# Patient Record
Sex: FEMALE | Race: BLACK OR AFRICAN AMERICAN | Employment: OTHER | URBAN - METROPOLITAN AREA
[De-identification: names, ages, dates, MRNs, and addresses within clinical notes are randomized per-mention and may not be internally consistent; named-entity substitution may affect disease eponyms.]

---

## 2022-08-24 ENCOUNTER — HOSPITAL ENCOUNTER (EMERGENCY)
Age: 67
Discharge: HOME | End: 2022-08-24
Attending: EMERGENCY MEDICINE
Payer: MEDICAID

## 2022-08-24 ENCOUNTER — APPOINTMENT (OUTPATIENT)
Dept: GENERAL RADIOLOGY | Age: 67
End: 2022-08-24
Attending: EMERGENCY MEDICINE
Payer: MEDICAID

## 2022-08-24 VITALS
BODY MASS INDEX: 27.32 KG/M2 | TEMPERATURE: 99.8 F | OXYGEN SATURATION: 98 % | HEIGHT: 66 IN | DIASTOLIC BLOOD PRESSURE: 70 MMHG | HEART RATE: 82 BPM | WEIGHT: 170 LBS | RESPIRATION RATE: 16 BRPM | SYSTOLIC BLOOD PRESSURE: 128 MMHG

## 2022-08-24 DIAGNOSIS — J06.9 ACUTE UPPER RESPIRATORY INFECTION: Primary | ICD-10-CM

## 2022-08-24 DIAGNOSIS — R50.9 ACUTE FEBRILE ILLNESS: ICD-10-CM

## 2022-08-24 LAB
FLUAV AG NPH QL IA: NEGATIVE
FLUBV AG NOSE QL IA: NEGATIVE
SARS-COV-2, XPLCVT: NOT DETECTED
SOURCE, COVRS: NORMAL

## 2022-08-24 PROCEDURE — 87804 INFLUENZA ASSAY W/OPTIC: CPT

## 2022-08-24 PROCEDURE — 99283 EMERGENCY DEPT VISIT LOW MDM: CPT

## 2022-08-24 PROCEDURE — 71045 X-RAY EXAM CHEST 1 VIEW: CPT

## 2022-08-24 PROCEDURE — U0005 INFEC AGEN DETEC AMPLI PROBE: HCPCS

## 2022-08-24 RX ORDER — CANDESARTAN 16 MG/1
TABLET ORAL DAILY
Status: ON HOLD | COMMUNITY
End: 2022-08-30

## 2022-08-24 RX ORDER — ATENOLOL 50 MG/1
TABLET ORAL DAILY
Status: ON HOLD | COMMUNITY
End: 2022-09-01 | Stop reason: SDUPTHER

## 2022-08-24 NOTE — ED TRIAGE NOTES
Fever, headache and fatigue since x days. Took Naproxen at home this morning approx 9:15 am for left knee pain.

## 2022-08-24 NOTE — ED PROVIDER NOTES
30-year-old female with history of diabetes and hypertension presents to the emergency department chief complaint of fever for the past several days. Fever up to 100.8 at home. She endorses headaches, myalgias, cough, congestion. She took a COVID test yesterday which was negative. She had COVID last year and is immunized. No flu vaccine. No sick contacts. No shortness of breath or chest pain. The history is provided by the patient and medical records. Headache   This is a new problem. The current episode started more than 2 days ago. Associated symptoms include a fever. Pertinent negatives include no shortness of breath, no weakness, no nausea and no vomiting. Fever   The current episode started more than 2 days ago. The maximum temperature noted was 100 - 100.9 F. Associated symptoms include headaches and cough. Pertinent negatives include no chest pain, no diarrhea, no vomiting, no sore throat, no shortness of breath and no rash. Fatigue  Pertinent negatives include no agitation. Associated symptoms include headaches. Pertinent negatives include no shortness of breath, no chest pain, no vomiting and no nausea. Past Medical History:   Diagnosis Date    Diabetes ([address]     Hypertension        Past Surgical History:   Procedure Laterality Date    HX GYN           History reviewed. No pertinent family history.     Social History     Socioeconomic History    Marital status: Not on file     Spouse name: Not on file    Number of children: Not on file    Years of education: Not on file    Highest education level: Not on file   Occupational History    Not on file   Tobacco Use    Smoking status: Not on file    Smokeless tobacco: Not on file   Vaping Use    Vaping Use: Not on file   Substance and Sexual Activity    Alcohol use: Not on file    Drug use: Never    Sexual activity: Not Currently   Other Topics Concern    Not on file   Social History Narrative    Not on file     Social Determinants of Health Financial Resource Strain: Not on file   Food Insecurity: Not on file   Transportation Needs: Not on file   Physical Activity: Not on file   Stress: Not on file   Social Connections: Not on file   Intimate Partner Violence: Not on file   Housing Stability: Not on file         ALLERGIES: Penicillins    Review of Systems   Constitutional:  Positive for fatigue and fever. HENT:  Negative for sneezing and sore throat. Respiratory:  Positive for cough. Negative for shortness of breath. Cardiovascular:  Negative for chest pain and leg swelling. Gastrointestinal:  Negative for abdominal pain, diarrhea, nausea and vomiting. Genitourinary:  Negative for difficulty urinating and dysuria. Musculoskeletal:  Positive for myalgias. Negative for arthralgias. Skin:  Negative for color change and rash. Neurological:  Positive for headaches. Negative for weakness. Psychiatric/Behavioral:  Negative for agitation and behavioral problems. Vitals:    08/24/22 1005 08/24/22 1012   BP: 128/70    Pulse: 82    Resp: 16    Temp: 99.8 °F (37.7 °C)    SpO2: 98%    Weight:  77.1 kg (170 lb)   Height:  5' 6\" (1.676 m)            Physical Exam  Vitals and nursing note reviewed. Constitutional:       General: She is not in acute distress. Appearance: Normal appearance. She is well-developed. She is not ill-appearing, toxic-appearing or diaphoretic. HENT:      Head: Normocephalic and atraumatic. Nose: Nose normal.      Mouth/Throat:      Mouth: Mucous membranes are moist.      Pharynx: Oropharynx is clear. Eyes:      Extraocular Movements: Extraocular movements intact. Conjunctiva/sclera: Conjunctivae normal.      Pupils: Pupils are equal, round, and reactive to light. Cardiovascular:      Rate and Rhythm: Normal rate and regular rhythm. Pulses: Normal pulses. Heart sounds: Normal heart sounds. Pulmonary:      Effort: Pulmonary effort is normal. No respiratory distress.       Breath sounds: Normal breath sounds. No wheezing. Chest:      Chest wall: No tenderness. Abdominal:      General: Abdomen is flat. There is no distension. Palpations: Abdomen is soft. Tenderness: There is no abdominal tenderness. There is no guarding or rebound. Musculoskeletal:         General: No swelling, tenderness, deformity or signs of injury. Normal range of motion. Cervical back: Normal range of motion and neck supple. No rigidity. No muscular tenderness. Right lower leg: No edema. Left lower leg: No edema. Skin:     General: Skin is warm and dry. Capillary Refill: Capillary refill takes less than 2 seconds. Neurological:      General: No focal deficit present. Mental Status: She is alert and oriented to person, place, and time. Psychiatric:         Mood and Affect: Mood normal.         Behavior: Behavior normal.        MDM  Number of Diagnoses or Management Options  Diagnosis management comments: 45-year-old female presents as above with fever and other symptoms of upper respiratory infection. Reassuring chest x-ray and negative rapid flu. Potentially has COVID, will send PCR as she already tested negative with a rapid. Treat symptomatically, follow-up with primary care, return if needed.        Amount and/or Complexity of Data Reviewed  Clinical lab tests: reviewed  Tests in the radiology section of CPT®: reviewed           Procedures

## 2022-08-25 ENCOUNTER — HOSPITAL ENCOUNTER (INPATIENT)
Age: 67
LOS: 7 days | Discharge: HOME HEALTH | DRG: 853 | End: 2022-09-01
Attending: EMERGENCY MEDICINE | Admitting: STUDENT IN AN ORGANIZED HEALTH CARE EDUCATION/TRAINING PROGRAM
Payer: MEDICAID

## 2022-08-25 ENCOUNTER — APPOINTMENT (OUTPATIENT)
Dept: CT IMAGING | Age: 67
DRG: 853 | End: 2022-08-25
Attending: EMERGENCY MEDICINE

## 2022-08-25 DIAGNOSIS — R78.81 MSSA BACTEREMIA: ICD-10-CM

## 2022-08-25 DIAGNOSIS — A41.01 SEPSIS DUE TO METHICILLIN SUSCEPTIBLE STAPHYLOCOCCUS AUREUS, UNSPECIFIED WHETHER ACUTE ORGAN DYSFUNCTION PRESENT (HCC): ICD-10-CM

## 2022-08-25 DIAGNOSIS — R65.20 SEVERE SEPSIS (HCC): Primary | ICD-10-CM

## 2022-08-25 DIAGNOSIS — N30.91 HEMORRHAGIC CYSTITIS: ICD-10-CM

## 2022-08-25 DIAGNOSIS — A41.9 SEVERE SEPSIS (HCC): Primary | ICD-10-CM

## 2022-08-25 DIAGNOSIS — B95.61 MSSA BACTEREMIA: ICD-10-CM

## 2022-08-25 DIAGNOSIS — M00.062 STAPHYLOCOCCAL ARTHRITIS OF LEFT KNEE (HCC): ICD-10-CM

## 2022-08-25 LAB
ALBUMIN SERPL-MCNC: 3.1 G/DL (ref 3.5–5)
ALBUMIN/GLOB SERPL: 0.6 {RATIO} (ref 1.1–2.2)
ALP SERPL-CCNC: 128 U/L (ref 45–117)
ALT SERPL-CCNC: 15 U/L (ref 12–78)
ANION GAP SERPL CALC-SCNC: 7 MMOL/L (ref 5–15)
APPEARANCE UR: ABNORMAL
AST SERPL-CCNC: 12 U/L (ref 15–37)
BACTERIA URNS QL MICRO: NEGATIVE /HPF
BASOPHILS # BLD: 0 K/UL (ref 0–0.1)
BASOPHILS NFR BLD: 0 % (ref 0–1)
BILIRUB SERPL-MCNC: 0.6 MG/DL (ref 0.2–1)
BILIRUB UR QL: NEGATIVE
BUN SERPL-MCNC: 17 MG/DL (ref 6–20)
BUN/CREAT SERPL: 16 (ref 12–20)
CALCIUM SERPL-MCNC: 9.5 MG/DL (ref 8.5–10.1)
CHLORIDE SERPL-SCNC: 96 MMOL/L (ref 97–108)
CO2 SERPL-SCNC: 30 MMOL/L (ref 21–32)
COLOR UR: ABNORMAL
CREAT SERPL-MCNC: 1.04 MG/DL (ref 0.55–1.02)
CRP SERPL-MCNC: 27.02 MG/DL (ref 0–0.6)
DIFFERENTIAL METHOD BLD: ABNORMAL
EOSINOPHIL # BLD: 0.3 K/UL (ref 0–0.4)
EOSINOPHIL NFR BLD: 2 % (ref 0–7)
EPITH CASTS URNS QL MICRO: ABNORMAL /LPF
ERYTHROCYTE [DISTWIDTH] IN BLOOD BY AUTOMATED COUNT: 12.3 % (ref 11.5–14.5)
GLOBULIN SER CALC-MCNC: 5 G/DL (ref 2–4)
GLUCOSE SERPL-MCNC: 292 MG/DL (ref 65–100)
GLUCOSE UR STRIP.AUTO-MCNC: >1000 MG/DL
HCT VFR BLD AUTO: 36.1 % (ref 35–47)
HGB BLD-MCNC: 11.9 G/DL (ref 11.5–16)
HGB UR QL STRIP: ABNORMAL
IMM GRANULOCYTES # BLD AUTO: 0 K/UL
IMM GRANULOCYTES NFR BLD AUTO: 0 %
KETONES UR QL STRIP.AUTO: 40 MG/DL
LACTATE BLD-SCNC: 1.16 MMOL/L (ref 0.4–2)
LACTATE BLD-SCNC: 2.71 MMOL/L (ref 0.4–2)
LEUKOCYTE ESTERASE UR QL STRIP.AUTO: ABNORMAL
LYMPHOCYTES # BLD: 2.9 K/UL (ref 0.8–3.5)
LYMPHOCYTES NFR BLD: 21 % (ref 12–49)
MCH RBC QN AUTO: 27 PG (ref 26–34)
MCHC RBC AUTO-ENTMCNC: 33 G/DL (ref 30–36.5)
MCV RBC AUTO: 81.9 FL (ref 80–99)
MONOCYTES # BLD: 1 K/UL (ref 0–1)
MONOCYTES NFR BLD: 7 % (ref 5–13)
NEUTS BAND NFR BLD MANUAL: 4 % (ref 0–6)
NEUTS SEG # BLD: 9.8 K/UL (ref 1.8–8)
NEUTS SEG NFR BLD: 66 % (ref 32–75)
NITRITE UR QL STRIP.AUTO: NEGATIVE
NRBC # BLD: 0 K/UL (ref 0–0.01)
NRBC BLD-RTO: 0 PER 100 WBC
PH UR STRIP: 6 [PH] (ref 5–8)
PLATELET # BLD AUTO: 380 K/UL (ref 150–400)
PMV BLD AUTO: 10 FL (ref 8.9–12.9)
POTASSIUM SERPL-SCNC: 3.3 MMOL/L (ref 3.5–5.1)
PROCALCITONIN SERPL-MCNC: 0.15 NG/ML
PROT SERPL-MCNC: 8.1 G/DL (ref 6.4–8.2)
PROT UR STRIP-MCNC: 100 MG/DL
RBC # BLD AUTO: 4.41 M/UL (ref 3.8–5.2)
RBC #/AREA URNS HPF: >100 /HPF (ref 0–5)
RBC MORPH BLD: ABNORMAL
SODIUM SERPL-SCNC: 133 MMOL/L (ref 136–145)
SP GR UR REFRACTOMETRY: 1.02 (ref 1–1.03)
UA: UC IF INDICATED,UAUC: ABNORMAL
UROBILINOGEN UR QL STRIP.AUTO: 1 EU/DL (ref 0.2–1)
WBC # BLD AUTO: 14 K/UL (ref 3.6–11)
WBC URNS QL MICRO: ABNORMAL /HPF (ref 0–4)

## 2022-08-25 PROCEDURE — 83605 ASSAY OF LACTIC ACID: CPT

## 2022-08-25 PROCEDURE — 96368 THER/DIAG CONCURRENT INF: CPT

## 2022-08-25 PROCEDURE — 80053 COMPREHEN METABOLIC PANEL: CPT

## 2022-08-25 PROCEDURE — 87040 BLOOD CULTURE FOR BACTERIA: CPT

## 2022-08-25 PROCEDURE — 96361 HYDRATE IV INFUSION ADD-ON: CPT

## 2022-08-25 PROCEDURE — 87186 SC STD MICRODIL/AGAR DIL: CPT

## 2022-08-25 PROCEDURE — 87150 DNA/RNA AMPLIFIED PROBE: CPT

## 2022-08-25 PROCEDURE — 96365 THER/PROPH/DIAG IV INF INIT: CPT

## 2022-08-25 PROCEDURE — 99285 EMERGENCY DEPT VISIT HI MDM: CPT

## 2022-08-25 PROCEDURE — 81001 URINALYSIS AUTO W/SCOPE: CPT

## 2022-08-25 PROCEDURE — 84145 PROCALCITONIN (PCT): CPT

## 2022-08-25 PROCEDURE — 74176 CT ABD & PELVIS W/O CONTRAST: CPT

## 2022-08-25 PROCEDURE — 96375 TX/PRO/DX INJ NEW DRUG ADDON: CPT

## 2022-08-25 PROCEDURE — 74011000250 HC RX REV CODE- 250: Performed by: EMERGENCY MEDICINE

## 2022-08-25 PROCEDURE — 87086 URINE CULTURE/COLONY COUNT: CPT

## 2022-08-25 PROCEDURE — 87077 CULTURE AEROBIC IDENTIFY: CPT

## 2022-08-25 PROCEDURE — 86140 C-REACTIVE PROTEIN: CPT

## 2022-08-25 PROCEDURE — 85025 COMPLETE CBC W/AUTO DIFF WBC: CPT

## 2022-08-25 PROCEDURE — 36415 COLL VENOUS BLD VENIPUNCTURE: CPT

## 2022-08-25 PROCEDURE — 93005 ELECTROCARDIOGRAM TRACING: CPT

## 2022-08-25 PROCEDURE — 74011250636 HC RX REV CODE- 250/636: Performed by: EMERGENCY MEDICINE

## 2022-08-25 PROCEDURE — 65270000029 HC RM PRIVATE

## 2022-08-25 PROCEDURE — 74011250637 HC RX REV CODE- 250/637: Performed by: EMERGENCY MEDICINE

## 2022-08-25 RX ORDER — VANCOMYCIN 2 GRAM/500 ML IN 0.9 % SODIUM CHLORIDE INTRAVENOUS
2000 ONCE
Status: DISCONTINUED | OUTPATIENT
Start: 2022-08-25 | End: 2022-08-25

## 2022-08-25 RX ORDER — ACETAMINOPHEN 500 MG
1000 TABLET ORAL
Status: DISPENSED | OUTPATIENT
Start: 2022-08-25 | End: 2022-08-26

## 2022-08-25 RX ORDER — IBUPROFEN 800 MG/1
800 TABLET ORAL
Status: COMPLETED | OUTPATIENT
Start: 2022-08-25 | End: 2022-08-25

## 2022-08-25 RX ORDER — ACETAMINOPHEN 500 MG
1000 TABLET ORAL
Status: COMPLETED | OUTPATIENT
Start: 2022-08-25 | End: 2022-08-25

## 2022-08-25 RX ORDER — CODEINE PHOSPHATE AND GUAIFENESIN 10; 100 MG/5ML; MG/5ML
10 SOLUTION ORAL
Status: COMPLETED | OUTPATIENT
Start: 2022-08-25 | End: 2022-08-25

## 2022-08-25 RX ORDER — SODIUM CHLORIDE 0.9 % (FLUSH) 0.9 %
5-10 SYRINGE (ML) INJECTION AS NEEDED
Status: DISCONTINUED | OUTPATIENT
Start: 2022-08-25 | End: 2022-09-01 | Stop reason: HOSPADM

## 2022-08-25 RX ADMIN — SODIUM CHLORIDE 1000 ML: 9 INJECTION, SOLUTION INTRAVENOUS at 20:11

## 2022-08-25 RX ADMIN — ACETAMINOPHEN 1000 MG: 500 TABLET ORAL at 21:11

## 2022-08-25 RX ADMIN — SODIUM CHLORIDE 1000 ML: 9 INJECTION, SOLUTION INTRAVENOUS at 20:54

## 2022-08-25 RX ADMIN — CEFEPIME 2 G: 2 INJECTION, POWDER, FOR SOLUTION INTRAVENOUS at 20:03

## 2022-08-25 RX ADMIN — GUAIFENESIN AND CODEINE PHOSPHATE 10 ML: 100; 10 SOLUTION ORAL at 21:11

## 2022-08-25 RX ADMIN — IBUPROFEN 800 MG: 800 TABLET, FILM COATED ORAL at 20:03

## 2022-08-25 RX ADMIN — SODIUM CHLORIDE 500 ML: 900 INJECTION, SOLUTION INTRAVENOUS at 21:55

## 2022-08-25 RX ADMIN — VANCOMYCIN HYDROCHLORIDE 1000 MG: 1 INJECTION, POWDER, LYOPHILIZED, FOR SOLUTION INTRAVENOUS at 21:55

## 2022-08-25 RX ADMIN — VANCOMYCIN HYDROCHLORIDE 1000 MG: 1 INJECTION, POWDER, LYOPHILIZED, FOR SOLUTION INTRAVENOUS at 22:13

## 2022-08-25 NOTE — ED PROVIDER NOTES
71-year-old female with a past medical history significant for diabetes and hypertension who presents to the ER with a complaint of malaise, body aches, left leg pain and bloody urination that began this evening. The patient was seen evaluated for URI symptoms that she has had off and on for the past 3 days yesterday. She had a negative chest x-ray. She denies any nausea or vomiting, abdominal pain, headache, neck and back pain, skin rash, sick contacts or recent travel, history of anticoagulant use. She is vaccinated against COVID-19. Past Medical History:   Diagnosis Date    Diabetes ([facility] [address]     Hypertension        Past Surgical History:   Procedure Laterality Date    HX GYN           History reviewed. No pertinent family history. Social History     Socioeconomic History    Marital status:      Spouse name: Not on file    Number of children: Not on file    Years of education: Not on file    Highest education level: Not on file   Occupational History    Not on file   Tobacco Use    Smoking status: Not on file    Smokeless tobacco: Not on file   Vaping Use    Vaping Use: Not on file   Substance and Sexual Activity    Alcohol use: Not on file    Drug use: Never    Sexual activity: Not Currently   Other Topics Concern    Not on file   Social History Narrative    Not on file     Social Determinants of Health     Financial Resource Strain: Not on file   Food Insecurity: Not on file   Transportation Needs: Not on file   Physical Activity: Not on file   Stress: Not on file   Social Connections: Not on file   Intimate Partner Violence: Not on file   Housing Stability: Not on file         ALLERGIES: Penicillins    Review of Systems   All other systems reviewed and are negative.     Vitals:    08/25/22 1931 08/25/22 1934   BP: (!) 139/116 (!) 157/70   Pulse: 96    Resp: 18    Temp: (!) 102.8 °F (39.3 °C)    SpO2: 100%    Weight: 79.4 kg (175 lb)    Height: 5' 5\" (1.651 m)             Physical Exam  Vitals and nursing note reviewed. Exam conducted with a chaperone present. CONSTITUTIONAL: Well-appearing; well-nourished; in no apparent distress  HEAD: Normocephalic; atraumatic  EYES: PERRL; EOM intact; conjunctiva and sclera are clear bilaterally. ENT: No rhinorrhea; normal pharynx with no tonsillar hypertrophy; mucous membranes pink/moist, no erythema, no exudate. NECK: Supple; non-tender; no cervical lymphadenopathy  CARD: Normal S1, S2; no murmurs, rubs, or gallops. Regular rate and rhythm. RESP: Normal respiratory effort; breath sounds clear and equal bilaterally; no wheezes, rhonchi, or rales. ABD: Normal bowel sounds; non-distended; non-tender; no palpable organomegaly, no masses, no bruits. Back Exam: Normal inspection; no vertebral point tenderness, no CVA tenderness. Normal range of motion. EXT: Normal ROM in all four extremities; non-tender to palpation; no swelling or deformity; distal pulses are normal, no edema. SKIN: Warm; dry; no rash. NEURO:Alert and oriented x 3, coherent, ANGELES-XII grossly intact, sensory and motor are non-focal.        MDM  Number of Diagnoses or Management Options  Hemorrhagic cystitis  Severe sepsis ([facility] [address]  Diagnosis management comments: Assessment: Acute febrile illness on a 53-year-old female with hypertension diabetes with malaise, fever and hematuria. Rule out sepsis with occult bacteremia especially UTI/cystitis/pyelonephritis. Plan: Lab/IV fluid/antipyretic/broad-spectrum antibiotics/CT scan of the abdomen and pelvis/education, reassurance, symptomatic treatment/ Monitor and Reevaluate.          Amount and/or Complexity of Data Reviewed  Clinical lab tests: ordered and reviewed  Tests in the radiology section of CPT®: ordered and reviewed  Tests in the medicine section of CPT®: reviewed and ordered  Discussion of test results with the performing providers: yes  Decide to obtain previous medical records or to obtain history from someone other than the patient: yes  Obtain history from someone other than the patient: yes  Review and summarize past medical records: yes  Discuss the patient with other providers: yes  Independent visualization of images, tracings, or specimens: yes    Risk of Complications, Morbidity, and/or Mortality  Presenting problems: moderate  Diagnostic procedures: moderate  Management options: moderate           Procedures    PROGRESS NOTE:  Pt has been reexamined by Doc Lepe MD all available results have been reviewed with pt and any available family. Pt understands sx, dx, and tx in ED. Care plan has been outlined and questions have been answered. Pt and any available family understands and agrees to need for admission to hospital for further tx not available in ED. Pt is ready for admission. Written by Melvin Marks MD,  9:47 PM    ED EKG interpretation:  Rhythm: normal sinus rhythm; and regular . Rate (approx.): 89; Axis: normal; P wave: normal; QRS interval: normal ; ST/T wave: non-specific changes; in  Lead: Diffusely; Other findings: abnormal ekg. This EKG was interpreted by Melvin Marks MD,ED Provider. CONSULT NOTE:  Doc Lepe MD spoke with Dr. Hector Reese of the adult hospitalist team. Discussed patient's presentation, history, physical assessment, and available diagnostic results. He will evaluate, write orders and admit the patient to the hospital. 9:48 PM     Perfect Serve Consult for Admission  9:48 PM    ED Room Number: WER02/02  Patient Name and age:  Ned Meredith 74 y.o.  female  Working Diagnosis:   1. Severe sepsis ([address]    2.  Hemorrhagic cystitis        COVID-19 Suspicion:  no  Sepsis present:  yes  Reassessment needed: yes  Code Status:  Full Code  Readmission: no  Isolation Requirements:  yes  Recommended Level of Care:  med/surg  Department: [facility] ED - [phone]  Admitting Provider: Hector Reese    Other: 62-year-old with diabetes who returned to the ER after negative for congestion with fever and bloody urine. She did evaluation for severe sepsis with hemorrhagic cystitis and occult bacteremia. Had negative COVID, influenza and chest x-ray yesterday. Total critical care time spent exclusive of procedures:  65 minutes. Code Sepsis Reassessment & Plan    - Sepsis order set entered: YES  - Broad Spectrum Antibiotics given: Cefepime and Vancomycin  - Repeat lactic acid ordered for time now  - Re-assessment performed at time 23:30 and clinical condition improving.    - Actions taken: None. - Hypotension or Lactic Acidosis present (SBP<90, MAP<65, Lactate >4): NO   - IVF: 30 cc/kg actual Body Weight  - Persistent Septic Shock present (Hypotension despite IVF resuscitation): NO  Vasopressors: Not indicated due to septic shock not present  - Disposition: Admit to the Renetta Andre MD 23:30             .

## 2022-08-25 NOTE — ED TRIAGE NOTES
Pt reports she was here yesterday for leg pain which continues today and now has developed blood in the urine this morning.

## 2022-08-26 LAB
ACC. NO. FROM MICRO ORDER, ACCP: ABNORMAL
ACINETOBACTER CALCOACETICUS-BAUMANII COMPLEX, ACBCX: NOT DETECTED
ALBUMIN SERPL-MCNC: 2.3 G/DL (ref 3.5–5)
ALBUMIN/GLOB SERPL: 0.6 {RATIO} (ref 1.1–2.2)
ALP SERPL-CCNC: 104 U/L (ref 45–117)
ALT SERPL-CCNC: 14 U/L (ref 12–78)
ANION GAP SERPL CALC-SCNC: 6 MMOL/L (ref 5–15)
AST SERPL-CCNC: 15 U/L (ref 15–37)
B PERT DNA SPEC QL NAA+PROBE: NOT DETECTED
BACTEROIDES FRAGILIS, BFRA: NOT DETECTED
BASOPHILS # BLD: 0 K/UL (ref 0–0.1)
BASOPHILS NFR BLD: 0 % (ref 0–1)
BILIRUB SERPL-MCNC: 0.4 MG/DL (ref 0.2–1)
BIOFIRE COMMENT, BCIDPF: ABNORMAL
BORDETELLA PARAPERTUSSIS PCR, BORPAR: NOT DETECTED
BUN SERPL-MCNC: 12 MG/DL (ref 6–20)
BUN/CREAT SERPL: 19 (ref 12–20)
C GLABRATA DNA VAG QL NAA+PROBE: NOT DETECTED
C PNEUM DNA SPEC QL NAA+PROBE: NOT DETECTED
CALCIUM SERPL-MCNC: 8.3 MG/DL (ref 8.5–10.1)
CANDIDA ALBICANS: NOT DETECTED
CANDIDA AURIS, CAAU: NOT DETECTED
CANDIDA KRUSEI, CKRP: NOT DETECTED
CANDIDA PARAPSILOSIS, CPAUP: NOT DETECTED
CANDIDA TROPICALIS, CTROP: NOT DETECTED
CHLORIDE SERPL-SCNC: 108 MMOL/L (ref 97–108)
CO2 SERPL-SCNC: 27 MMOL/L (ref 21–32)
CREAT SERPL-MCNC: 0.64 MG/DL (ref 0.55–1.02)
CRYPTO NEOFORMANS/GATTII, CRYNEG: NOT DETECTED
DIFFERENTIAL METHOD BLD: ABNORMAL
ENTEROBACTER CLOACAE COMPLEX, ECCP: NOT DETECTED
ENTEROBACTERALES SP. , ENBLS: NOT DETECTED
ENTEROCOCCUS FAECALIS, ENFA: NOT DETECTED
ENTEROCOCCUS FAECIUM, ENFAM: NOT DETECTED
EOSINOPHIL # BLD: 0.1 K/UL (ref 0–0.4)
EOSINOPHIL NFR BLD: 1 % (ref 0–7)
ERYTHROCYTE [DISTWIDTH] IN BLOOD BY AUTOMATED COUNT: 12.1 % (ref 11.5–14.5)
ESCHERICHIA COLI: NOT DETECTED
EST. AVERAGE GLUCOSE BLD GHB EST-MCNC: 258 MG/DL
FLUAV SUBTYP SPEC NAA+PROBE: NOT DETECTED
FLUBV RNA SPEC QL NAA+PROBE: NOT DETECTED
GLOBULIN SER CALC-MCNC: 4 G/DL (ref 2–4)
GLUCOSE BLD STRIP.AUTO-MCNC: 182 MG/DL (ref 65–117)
GLUCOSE BLD STRIP.AUTO-MCNC: 273 MG/DL (ref 65–117)
GLUCOSE BLD STRIP.AUTO-MCNC: 288 MG/DL (ref 65–117)
GLUCOSE SERPL-MCNC: 200 MG/DL (ref 65–100)
HADV DNA SPEC QL NAA+PROBE: NOT DETECTED
HAEMOPHILUS INFLUENZAE, HMI: NOT DETECTED
HBA1C MFR BLD: 10.6 % (ref 4–5.6)
HCOV 229E RNA SPEC QL NAA+PROBE: NOT DETECTED
HCOV HKU1 RNA SPEC QL NAA+PROBE: NOT DETECTED
HCOV NL63 RNA SPEC QL NAA+PROBE: NOT DETECTED
HCOV OC43 RNA SPEC QL NAA+PROBE: NOT DETECTED
HCT VFR BLD AUTO: 29.8 % (ref 35–47)
HGB BLD-MCNC: 10.1 G/DL (ref 11.5–16)
HMPV RNA SPEC QL NAA+PROBE: NOT DETECTED
HPIV1 RNA SPEC QL NAA+PROBE: NOT DETECTED
HPIV2 RNA SPEC QL NAA+PROBE: NOT DETECTED
HPIV3 RNA SPEC QL NAA+PROBE: NOT DETECTED
HPIV4 RNA SPEC QL NAA+PROBE: NOT DETECTED
IMM GRANULOCYTES # BLD AUTO: 0 K/UL
IMM GRANULOCYTES NFR BLD AUTO: 0 %
KLEBSIELLA AEROGENES, KLAE: NOT DETECTED
KLEBSIELLA OXYTOCA: NOT DETECTED
KLEBSIELLA PNEUMONIAE GROUP, KPPG: NOT DETECTED
LISTERIA MONOCYTOGENES, LMONP: NOT DETECTED
LYMPHOCYTES # BLD: 3.1 K/UL (ref 0.8–3.5)
LYMPHOCYTES NFR BLD: 27 % (ref 12–49)
M PNEUMO DNA SPEC QL NAA+PROBE: NOT DETECTED
MCH RBC QN AUTO: 27.6 PG (ref 26–34)
MCHC RBC AUTO-ENTMCNC: 33.9 G/DL (ref 30–36.5)
MCV RBC AUTO: 81.4 FL (ref 80–99)
MECA/C AND MREJ (METHICILLIN RESISTANT GENE), MECAC: NOT DETECTED
MONOCYTES # BLD: 0.8 K/UL (ref 0–1)
MONOCYTES NFR BLD: 7 % (ref 5–13)
MYELOCYTES NFR BLD MANUAL: 1 %
NEISSERIA MENINGITIDIS, NMNI: NOT DETECTED
NEUTS BAND NFR BLD MANUAL: 5 % (ref 0–6)
NEUTS SEG # BLD: 7.3 K/UL (ref 1.8–8)
NEUTS SEG NFR BLD: 59 % (ref 32–75)
NRBC # BLD: 0 K/UL (ref 0–0.01)
NRBC BLD-RTO: 0 PER 100 WBC
PLATELET # BLD AUTO: 322 K/UL (ref 150–400)
PMV BLD AUTO: 9.1 FL (ref 8.9–12.9)
POTASSIUM SERPL-SCNC: 3.2 MMOL/L (ref 3.5–5.1)
PROT SERPL-MCNC: 6.3 G/DL (ref 6.4–8.2)
PROTEUS, PRP: NOT DETECTED
PSEUDOMONAS AERUGINOSA: NOT DETECTED
RBC # BLD AUTO: 3.66 M/UL (ref 3.8–5.2)
RBC MORPH BLD: ABNORMAL
RESISTANT GENE SPACE, REGENE: ABNORMAL
RSV RNA SPEC QL NAA+PROBE: NOT DETECTED
RV+EV RNA SPEC QL NAA+PROBE: NOT DETECTED
SALMONELLA, SALMO: NOT DETECTED
SARS-COV-2 PCR, COVPCR: NOT DETECTED
SERRATIA MARCESCENS: NOT DETECTED
SERVICE CMNT-IMP: ABNORMAL
SODIUM SERPL-SCNC: 141 MMOL/L (ref 136–145)
STAPH EPIDERMIDIS, STEP: NOT DETECTED
STAPH LUGDUNENSIS, STALUG: NOT DETECTED
STAPHYLOCOCCUS AUREUS: DETECTED
STAPHYLOCOCCUS, STAPP: DETECTED
STENO MALTOPHILIA, STMA: NOT DETECTED
STREPTOCOCCUS , STPSP: NOT DETECTED
STREPTOCOCCUS AGALACTIAE (GROUP B): NOT DETECTED
STREPTOCOCCUS PNEUMONIAE , SPNP: NOT DETECTED
STREPTOCOCCUS PYOGENES (GROUP A), SPYOP: NOT DETECTED
WBC # BLD AUTO: 11.4 K/UL (ref 3.6–11)

## 2022-08-26 PROCEDURE — 74011250637 HC RX REV CODE- 250/637: Performed by: STUDENT IN AN ORGANIZED HEALTH CARE EDUCATION/TRAINING PROGRAM

## 2022-08-26 PROCEDURE — 85025 COMPLETE CBC W/AUTO DIFF WBC: CPT

## 2022-08-26 PROCEDURE — 74011000258 HC RX REV CODE- 258: Performed by: INTERNAL MEDICINE

## 2022-08-26 PROCEDURE — 36415 COLL VENOUS BLD VENIPUNCTURE: CPT

## 2022-08-26 PROCEDURE — 80053 COMPREHEN METABOLIC PANEL: CPT

## 2022-08-26 PROCEDURE — 74011000258 HC RX REV CODE- 258: Performed by: STUDENT IN AN ORGANIZED HEALTH CARE EDUCATION/TRAINING PROGRAM

## 2022-08-26 PROCEDURE — 83036 HEMOGLOBIN GLYCOSYLATED A1C: CPT

## 2022-08-26 PROCEDURE — 74011636637 HC RX REV CODE- 636/637: Performed by: INTERNAL MEDICINE

## 2022-08-26 PROCEDURE — 74011250636 HC RX REV CODE- 250/636: Performed by: STUDENT IN AN ORGANIZED HEALTH CARE EDUCATION/TRAINING PROGRAM

## 2022-08-26 PROCEDURE — 74011250637 HC RX REV CODE- 250/637: Performed by: INTERNAL MEDICINE

## 2022-08-26 PROCEDURE — 74011250636 HC RX REV CODE- 250/636: Performed by: INTERNAL MEDICINE

## 2022-08-26 PROCEDURE — 74011000250 HC RX REV CODE- 250: Performed by: STUDENT IN AN ORGANIZED HEALTH CARE EDUCATION/TRAINING PROGRAM

## 2022-08-26 PROCEDURE — 74011250637 HC RX REV CODE- 250/637: Performed by: HOSPITALIST

## 2022-08-26 PROCEDURE — 82962 GLUCOSE BLOOD TEST: CPT

## 2022-08-26 PROCEDURE — 65270000029 HC RM PRIVATE

## 2022-08-26 PROCEDURE — 0202U NFCT DS 22 TRGT SARS-COV-2: CPT

## 2022-08-26 RX ORDER — ONDANSETRON 4 MG/1
4 TABLET, ORALLY DISINTEGRATING ORAL
Status: DISCONTINUED | OUTPATIENT
Start: 2022-08-26 | End: 2022-09-01 | Stop reason: HOSPADM

## 2022-08-26 RX ORDER — IBUPROFEN 400 MG/1
800 TABLET ORAL ONCE
Status: COMPLETED | OUTPATIENT
Start: 2022-08-26 | End: 2022-08-26

## 2022-08-26 RX ORDER — SODIUM CHLORIDE 0.9 % (FLUSH) 0.9 %
5-40 SYRINGE (ML) INJECTION EVERY 8 HOURS
Status: DISCONTINUED | OUTPATIENT
Start: 2022-08-26 | End: 2022-09-01 | Stop reason: HOSPADM

## 2022-08-26 RX ORDER — ACETAMINOPHEN 325 MG/1
650 TABLET ORAL
Status: DISCONTINUED | OUTPATIENT
Start: 2022-08-26 | End: 2022-09-01 | Stop reason: HOSPADM

## 2022-08-26 RX ORDER — IBUPROFEN 600 MG/1
600 TABLET ORAL
Status: DISCONTINUED | OUTPATIENT
Start: 2022-08-26 | End: 2022-09-01 | Stop reason: HOSPADM

## 2022-08-26 RX ORDER — SODIUM CHLORIDE 9 MG/ML
75 INJECTION, SOLUTION INTRAVENOUS CONTINUOUS
Status: DISCONTINUED | OUTPATIENT
Start: 2022-08-26 | End: 2022-08-26

## 2022-08-26 RX ORDER — ONDANSETRON 2 MG/ML
4 INJECTION INTRAMUSCULAR; INTRAVENOUS
Status: DISCONTINUED | OUTPATIENT
Start: 2022-08-26 | End: 2022-09-01 | Stop reason: HOSPADM

## 2022-08-26 RX ORDER — POTASSIUM CHLORIDE AND SODIUM CHLORIDE 900; 300 MG/100ML; MG/100ML
INJECTION, SOLUTION INTRAVENOUS CONTINUOUS
Status: DISCONTINUED | OUTPATIENT
Start: 2022-08-26 | End: 2022-08-27

## 2022-08-26 RX ORDER — INSULIN LISPRO 100 [IU]/ML
INJECTION, SOLUTION INTRAVENOUS; SUBCUTANEOUS
Status: DISCONTINUED | OUTPATIENT
Start: 2022-08-26 | End: 2022-09-01 | Stop reason: HOSPADM

## 2022-08-26 RX ORDER — VALSARTAN 80 MG/1
160 TABLET ORAL DAILY
Status: DISCONTINUED | OUTPATIENT
Start: 2022-08-26 | End: 2022-08-27

## 2022-08-26 RX ORDER — DEXTROSE MONOHYDRATE 100 MG/ML
0-250 INJECTION, SOLUTION INTRAVENOUS AS NEEDED
Status: DISCONTINUED | OUTPATIENT
Start: 2022-08-26 | End: 2022-09-01 | Stop reason: HOSPADM

## 2022-08-26 RX ORDER — GUAIFENESIN 100 MG/5ML
100 SOLUTION ORAL
Status: DISCONTINUED | OUTPATIENT
Start: 2022-08-26 | End: 2022-09-01 | Stop reason: HOSPADM

## 2022-08-26 RX ORDER — ACETAMINOPHEN 650 MG/1
650 SUPPOSITORY RECTAL
Status: DISCONTINUED | OUTPATIENT
Start: 2022-08-26 | End: 2022-09-01 | Stop reason: HOSPADM

## 2022-08-26 RX ORDER — POLYETHYLENE GLYCOL 3350 17 G/17G
17 POWDER, FOR SOLUTION ORAL DAILY PRN
Status: DISCONTINUED | OUTPATIENT
Start: 2022-08-26 | End: 2022-09-01 | Stop reason: HOSPADM

## 2022-08-26 RX ORDER — ENOXAPARIN SODIUM 100 MG/ML
40 INJECTION SUBCUTANEOUS EVERY 24 HOURS
Status: DISCONTINUED | OUTPATIENT
Start: 2022-08-27 | End: 2022-09-01 | Stop reason: HOSPADM

## 2022-08-26 RX ORDER — GUAIFENESIN 600 MG/1
600 TABLET, EXTENDED RELEASE ORAL EVERY 12 HOURS
Status: DISCONTINUED | OUTPATIENT
Start: 2022-08-26 | End: 2022-09-01 | Stop reason: HOSPADM

## 2022-08-26 RX ORDER — SODIUM CHLORIDE 0.9 % (FLUSH) 0.9 %
5-40 SYRINGE (ML) INJECTION AS NEEDED
Status: DISCONTINUED | OUTPATIENT
Start: 2022-08-26 | End: 2022-09-01 | Stop reason: HOSPADM

## 2022-08-26 RX ORDER — ATENOLOL 50 MG/1
50 TABLET ORAL DAILY
Status: DISCONTINUED | OUTPATIENT
Start: 2022-08-26 | End: 2022-09-01 | Stop reason: HOSPADM

## 2022-08-26 RX ORDER — MAGNESIUM SULFATE 100 %
4 CRYSTALS MISCELLANEOUS AS NEEDED
Status: DISCONTINUED | OUTPATIENT
Start: 2022-08-26 | End: 2022-09-01 | Stop reason: HOSPADM

## 2022-08-26 RX ADMIN — Medication 2 UNITS: at 18:09

## 2022-08-26 RX ADMIN — Medication 10 ML: at 12:01

## 2022-08-26 RX ADMIN — GUAIFENESIN 600 MG: 600 TABLET ORAL at 09:54

## 2022-08-26 RX ADMIN — CEFEPIME 2 G: 2 INJECTION, POWDER, FOR SOLUTION INTRAVENOUS at 17:36

## 2022-08-26 RX ADMIN — IBUPROFEN 600 MG: 600 TABLET, FILM COATED ORAL at 17:34

## 2022-08-26 RX ADMIN — INSULIN HUMAN 20 UNITS: 100 INJECTION, SUSPENSION SUBCUTANEOUS at 12:00

## 2022-08-26 RX ADMIN — VANCOMYCIN HYDROCHLORIDE 1000 MG: 1 INJECTION, POWDER, LYOPHILIZED, FOR SOLUTION INTRAVENOUS at 17:35

## 2022-08-26 RX ADMIN — ACETAMINOPHEN 650 MG: 325 TABLET, FILM COATED ORAL at 12:04

## 2022-08-26 RX ADMIN — INSULIN HUMAN 16 UNITS: 100 INJECTION, SUSPENSION SUBCUTANEOUS at 18:09

## 2022-08-26 RX ADMIN — Medication 3 UNITS: at 21:48

## 2022-08-26 RX ADMIN — Medication 10 ML: at 05:47

## 2022-08-26 RX ADMIN — SODIUM CHLORIDE 75 ML/HR: 9 INJECTION, SOLUTION INTRAVENOUS at 02:47

## 2022-08-26 RX ADMIN — Medication 5 UNITS: at 11:30

## 2022-08-26 RX ADMIN — CEFEPIME 2 G: 2 INJECTION, POWDER, FOR SOLUTION INTRAVENOUS at 09:54

## 2022-08-26 RX ADMIN — ACETAMINOPHEN 650 MG: 325 TABLET, FILM COATED ORAL at 21:52

## 2022-08-26 RX ADMIN — IBUPROFEN 800 MG: 400 TABLET, FILM COATED ORAL at 06:59

## 2022-08-26 RX ADMIN — POTASSIUM CHLORIDE AND SODIUM CHLORIDE: 900; 300 INJECTION, SOLUTION INTRAVENOUS at 10:03

## 2022-08-26 RX ADMIN — GUAIFENESIN 100 MG: 200 SOLUTION ORAL at 04:40

## 2022-08-26 RX ADMIN — Medication 10 ML: at 21:45

## 2022-08-26 RX ADMIN — ATENOLOL 50 MG: 50 TABLET ORAL at 09:54

## 2022-08-26 RX ADMIN — ACETAMINOPHEN 650 MG: 325 TABLET, FILM COATED ORAL at 02:47

## 2022-08-26 NOTE — H&P
History & Physical    Primary Care Provider: None  Source of Information: Patient and chart review    History of Presenting Illness:   Ayala Hutchinson is a 74 y.o. female with history of hypertension and insulin-dependent type 2 diabetes who presents to the hospital with complaints of fatigue, malaise and hematuria that began on evening prior to ED presentation. Reports urinary symptoms of for which she was evaluated 24 hours ago and had normal chest x-ray. Denies any associated dysuria, urgency, frequency or flank pain. The patient denies any fever, chills, chest or abdominal pain, nausea, vomiting, cough, congestion, recent illness, palpitations, or dysuria. Remarkable vitals on ER Presentations: T-max 102.9, BP to 160s over 70s  Labs Remarkable for: wbc 14, k-3.3, glu 292, crp 27.02, lactic jodee 2.71  ER Images: ct abd et pelvis: no acute process  ER Rx: 3.5L NS Bolus, cefepime     Review of Systems:  Pertinent items are noted in the History of Present Illness. Past Medical History:   Diagnosis Date    Diabetes ([address]     Hypertension       Past Surgical History:   Procedure Laterality Date    HX GYN       Prior to Admission medications    Medication Sig Start Date End Date Taking? Authorizing Provider   insulin NPH/insulin regular (NovoLIN 70/30 U-100 Insulin) 100 unit/mL (70-30) injection by SubCUTAneous route. Marisol Mccoy MD   atenoloL (TENORMIN) 50 mg tablet Take  by mouth daily. Marisol Mccoy MD   candesartan (Atacand) 16 mg tablet Take  by mouth daily. Marisol Mccoy MD     Allergies   Allergen Reactions    Penicillins Other (comments)     Was told by her mom that she was allergic      History reviewed. No pertinent family history.      SOCIAL HISTORY:  Patient resides:  Independently x   Assisted Living    SNF    With family care       Smoking history:   None x   Former    Chronic      Alcohol history:   None x   Social    Chronic      Ambulates: Independently x   w/cane    w/walker    w/wc    CODE STATUS:  DNR    Full x   Other      Objective:     Physical Exam:     Visit Vitals  /76 (BP 1 Location: Right upper arm, BP Patient Position: At rest)   Pulse 76   Temp 98.5 °F (36.9 °C)   Resp 13   Ht 5' 5\" (1.651 m)   Wt 79.4 kg (175 lb)   SpO2 95%   BMI 29.12 kg/m²      O2 Device: None (Room air)    General:  Alert, cooperative, no distress, appears stated age. Head:  Normocephalic, without obvious abnormality, atraumatic. Eyes:  Conjunctivae/corneas clear. PERRL, EOMs intact. Nose: Nares normal. Septum midline. Mucosa normal.        Neck: Supple, symmetrical, trachea midline       Lungs:   Clear to auscultation bilaterally. Chest wall:  No tenderness or deformity. Heart:  Regular rate and rhythm, S1, S2 normal   Abdomen:   Soft, non-tender. Bowel sounds normal. No masses,  No organomegaly. Extremities: Extremities normal, atraumatic, no cyanosis or edema. Pulses: 2+ and symmetric all extremities. Skin: Skin color, texture, turgor normal. No rashes or lesions   Neurologic: CNII-XII intact. Data Review:     Recent Days:  Recent Labs     08/25/22 1954   WBC 14.0*   HGB 11.9   HCT 36.1        Recent Labs     08/25/22 1954   *   K 3.3*   CL 96*   CO2 30   *   BUN 17   CREA 1.04*   CA 9.5   ALB 3.1*   ALT 15     No results for input(s): PH, PCO2, PO2, HCO3, FIO2 in the last 72 hours.     24 Hour Results:  Recent Results (from the past 24 hour(s))   URINALYSIS W/ REFLEX CULTURE    Collection Time: 08/25/22  7:28 PM    Specimen: Urine   Result Value Ref Range    Color RED      Appearance BLOODY (A) CLEAR      Specific gravity 1.025 1.003 - 1.030      pH (UA) 6.0 5.0 - 8.0      Protein 100 (A) NEG mg/dL    Glucose >1,000 (A) NEG mg/dL    Ketone 40 (A) NEG mg/dL    Bilirubin Negative NEG      Blood LARGE (A) NEG      Urobilinogen 1.0 0.2 - 1.0 EU/dL    Nitrites Negative NEG      Leukocyte Esterase TRACE (A) NEG WBC 20-50 0 - 4 /hpf    RBC >100 (H) 0 - 5 /hpf    Epithelial cells FEW FEW /lpf    Bacteria Negative NEG /hpf    UA:UC IF INDICATED URINE CULTURE ORDERED (A) CNI     CBC WITH AUTOMATED DIFF    Collection Time: 08/25/22  7:54 PM   Result Value Ref Range    WBC 14.0 (H) 3.6 - 11.0 K/uL    RBC 4.41 3.80 - 5.20 M/uL    HGB 11.9 11.5 - 16.0 g/dL    HCT 36.1 35.0 - 47.0 %    MCV 81.9 80.0 - 99.0 FL    MCH 27.0 26.0 - 34.0 PG    MCHC 33.0 30.0 - 36.5 g/dL    RDW 12.3 11.5 - 14.5 %    PLATELET 056 181 - 063 K/uL    MPV 10.0 8.9 - 12.9 FL    NRBC 0.0 0.0  WBC    ABSOLUTE NRBC 0.00 0.00 - 0.01 K/uL    NEUTROPHILS 66 32 - 75 %    BAND NEUTROPHILS 4 0 - 6 %    LYMPHOCYTES 21 12 - 49 %    MONOCYTES 7 5 - 13 %    EOSINOPHILS 2 0 - 7 %    BASOPHILS 0 0 - 1 %    IMMATURE GRANULOCYTES 0 %    ABS. NEUTROPHILS 9.8 (H) 1.8 - 8.0 K/UL    ABS. LYMPHOCYTES 2.9 0.8 - 3.5 K/UL    ABS. MONOCYTES 1.0 0.0 - 1.0 K/UL    ABS. EOSINOPHILS 0.3 0.0 - 0.4 K/UL    ABS. BASOPHILS 0.0 0.0 - 0.1 K/UL    ABS. IMM. GRANS. 0.0 K/UL    DF MANUAL      RBC COMMENTS NORMOCYTIC, NORMOCHROMIC     METABOLIC PANEL, COMPREHENSIVE    Collection Time: 08/25/22  7:54 PM   Result Value Ref Range    Sodium 133 (L) 136 - 145 mmol/L    Potassium 3.3 (L) 3.5 - 5.1 mmol/L    Chloride 96 (L) 97 - 108 mmol/L    CO2 30 21 - 32 mmol/L    Anion gap 7 5 - 15 mmol/L    Glucose 292 (H) 65 - 100 mg/dL    BUN 17 6 - 20 MG/DL    Creatinine 1.04 (H) 0.55 - 1.02 MG/DL    BUN/Creatinine ratio 16 12 - 20      GFR est AA >60 >60 ml/min/1.73m2    GFR est non-AA 53 (L) >60 ml/min/1.73m2    Calcium 9.5 8.5 - 10.1 MG/DL    Bilirubin, total 0.6 0.2 - 1.0 MG/DL    ALT (SGPT) 15 12 - 78 U/L    AST (SGOT) 12 (L) 15 - 37 U/L    Alk.  phosphatase 128 (H) 45 - 117 U/L    Protein, total 8.1 6.4 - 8.2 g/dL    Albumin 3.1 (L) 3.5 - 5.0 g/dL    Globulin 5.0 (H) 2.0 - 4.0 g/dL    A-G Ratio 0.6 (L) 1.1 - 2.2     C REACTIVE PROTEIN, QT    Collection Time: 08/25/22  7:54 PM   Result Value Ref Range C-Reactive protein 27.02 (H) 0.00 - 0.60 mg/dL   PROCALCITONIN    Collection Time: 08/25/22  7:54 PM   Result Value Ref Range    Procalcitonin 0.15 ng/mL   POC LACTIC ACID    Collection Time: 08/25/22  8:10 PM   Result Value Ref Range    Lactic Acid (POC) 2.71 (HH) 0.40 - 2.00 mmol/L   EKG, 12 LEAD, INITIAL    Collection Time: 08/25/22  9:48 PM   Result Value Ref Range    Ventricular Rate 89 BPM    Atrial Rate 89 BPM    P-R Interval 166 ms    QRS Duration 76 ms    Q-T Interval 370 ms    QTC Calculation (Bezet) 450 ms    Calculated P Axis 39 degrees    Calculated R Axis 27 degrees    Calculated T Axis 41 degrees    Diagnosis       Normal sinus rhythm  Nonspecific T wave abnormality  Abnormal ECG  No previous ECGs available     POC LACTIC ACID    Collection Time: 08/25/22 11:41 PM   Result Value Ref Range    Lactic Acid (POC) 1.16 0.40 - 2.00 mmol/L         Imaging:     Assessment:     Ned Meredith is a 74 y.o. female with history of hypertension and insulin-dependent type 2 diabetes who who is admitted for severe sepsis.        Plan:       Severe sepsis secondary to acute cystitis  -Continue twice daily cefepime  -Follow blood and urine culture  -Tylenol as needed for fevers    Hypertension  -Continue home atenolol and losartan    Insulin-dependent type 2 diabetes  -Lantus 10 units plus SSI  -Hypoglycemic protocols          FEN/GI -  [email or web address]  Activity - as tolerated  DVT prophylaxis - SCDs  GI prophylaxis -  NI  Disposition - home    CODE STATUS:  full code       Signed By: Praneeth Currie MD     August 26, 2022

## 2022-08-26 NOTE — PROGRESS NOTES
[address] [address] [facility] Pharmacy Progress Note: Antimicrobial Stewardship    Consult for antibiotic dosing of vancomycin by Dr. Souza Sensing  Cefepime adjusted per protocol. Indication: staph UTI, bacteremia?, sepsis  Day of Therapy: 1    Plan:  Vancomycin therapy:   Dose of vancomycin 2000 mg given 8/25 at 2155 in the ED   Follow with maintenance dose of vancomycin 1000 mg IV every 12 hours    Dose calculated to approximate a   Target AUC/ALEXANDRE of 400 to 600  Trough of n/a mcg/mL. Plan:  Plan for a level with am labs on 8/28 (not ordered yet)  Pharmacy to follow daily and will make changes to dose and/or frequency based on clinical status. Non-Kinetic Antimicrobial Dosing:   Current Regimen:  cefepime 2 gram IV every 12 hours (changed due to blood cultures flagged as positive)  Recommendation: cefepime 2 gram IV every 8 hours    Other Antimicrobial  (not dosed by pharmacist)   cefepime   Cultures     8/25 UTI > 100,00 probable staph aureus pending  8/25 Blood 2 bottles flagged positive by instrument  8/26 MRSA in process   Serum Creatinine     Lab Results   Component Value Date/Time    Creatinine 0.64 08/26/2022 03:00 AM       Creatinine Clearance Estimated Creatinine Clearance: 83.5 mL/min (by C-G formula based on SCr of 0.64 mg/dL). Procalcitonin    Lab Results   Component Value Date/Time    Procalcitonin 0.15 08/25/2022 07:54 PM        Temp   98.5 °F (36.9 °C)    WBC   Lab Results   Component Value Date/Time    WBC 11.4 (H) 08/26/2022 03:00 AM       For Antifungals, Metronidazole and Nafcillin: Lab Results   Component Value Date/Time    ALT (SGPT) 14 08/26/2022 03:00 AM    AST (SGOT) 15 08/26/2022 03:00 AM    Alk.  phosphatase 104 08/26/2022 03:00 AM    Bilirubin, total 0.4 08/26/2022 03:00 AM         Pharmacist: Celine Irvin

## 2022-08-26 NOTE — ED NOTES
TRANSFER - OUT REPORT:    Verbal report given to Stephanie Rhina(name) on Tony Camejo  being transferred to [facility] Room 414(unit) for routine progression of care       Report consisted of patients Situation, Background, Assessment and   Recommendations(SBAR). Information from the following report(s) SBAR, ED Summary, MAR, and Recent Results was reviewed with the receiving nurse. Lines:   Peripheral IV 08/25/22 Left Antecubital (Active)   Site Assessment Clean, dry, & intact 08/25/22 2010   Phlebitis Assessment 0 08/25/22 2010   Infiltration Assessment 0 08/25/22 2010   Dressing Status Clean, dry, & intact 08/25/22 2010   Hub Color/Line Status Pink 08/25/22 2010       Peripheral IV 08/25/22 Right Forearm (Active)   Site Assessment Clean, dry, & intact 08/25/22 2208   Phlebitis Assessment 0 08/25/22 2208   Infiltration Assessment 0 08/25/22 2208   Dressing Status Clean, dry, & intact 08/25/22 2208   Dressing Type Transparent 08/25/22 2208   Hub Color/Line Status Pink 08/25/22 2208        Opportunity for questions and clarification was provided.       Patient transported with:

## 2022-08-26 NOTE — PROGRESS NOTES
[facility] Progress Note: Antimicrobial Stewardship    Consult for antibiotic dosing of Vancomycin by Dr. Arthur Grier  Indication: sepsis  Day of Therapy: 1    Plan:  Vancomycin therapy:   Start with loading dose of vancomycin 2000 mg IV (25 mg/kg, max 2.5 gm)   Follow with maintenance dose of vancomycin 750 mg IV every 12 hours    Dose calculated to approximate a   Target AUC/ALEXANDRE of 400-600  Trough of : n/a     Plan:     Pharmacy to follow daily and will make changes to dose and/or frequency based on clinical status. Date Dose & Interval Measured (mcg/mL) Extrapolated (mcg/mL)   ? ? ? ?   ? ? ? ?   ? ? ? ? Non-Kinetic Antimicrobial Dosing:   Current Regimen:    Recommendation:   Dose administration notes:  Doses given appropriately as scheduled    Other Antimicrobial  (not dosed by pharmacist)      Cultures        Serum Creatinine     Lab Results   Component Value Date/Time    Creatinine 1.04 (H) 08/25/2022 07:54 PM       Creatinine Clearance Estimated Creatinine Clearance: 56.2 mL/min (A) (based on SCr of 1.04 mg/dL (H)). Procalcitonin    Lab Results   Component Value Date/Time    Procalcitonin 0.15 08/25/2022 07:54 PM        Temp   (!) 101 °F (38.3 °C)    WBC   Lab Results   Component Value Date/Time    WBC 14.0 (H) 08/25/2022 07:54 PM       For Antifungals, Metronidazole and Nafcillin: Lab Results   Component Value Date/Time    ALT (SGPT) 15 08/25/2022 07:54 PM    AST (SGOT) 12 (L) 08/25/2022 07:54 PM    Alk.  phosphatase 128 (H) 08/25/2022 07:54 PM    Bilirubin, total 0.6 08/25/2022 07:54 PM         Pharmacist: Signed Kristie Gibson

## 2022-08-26 NOTE — PROGRESS NOTES
[facility] [address]  [address] [facility], [address] [place] [address]  [phone]      Medical Progress Note      NAME: Darnell Patten   :  1955  MRM:  [id number]    Date/Time: 2022  2:48 PM         Subjective:     Chief Complaint: \"I'm feeling a bit better\"     Pt seen and examined. No complaints. Tmax overnight 102.9    ROS:  (bold if positive, if negative)         Objective:       Vitals:          Last 24hrs VS reviewed since prior progress note.  Most recent are:    Visit Vitals  /73 (BP 1 Location: Right upper arm, BP Patient Position: At rest;Lying)   Pulse 83   Temp 98.5 °F (36.9 °C)   Resp 15   Ht 5' 5\" (1.651 m)   Wt 79.4 kg (175 lb)   SpO2 96%   BMI 29.12 kg/m²     SpO2 Readings from Last 6 Encounters:   22 96%   22 98%          Intake/Output Summary (Last 24 hours) at 2022 1448  Last data filed at 2022 2331  Gross per 24 hour   Intake 2500 ml   Output --   Net 2500 ml          Exam:     Physical Exam:    Gen:  Well-developed, well-nourished, in no acute distress  HEENT:  Pink conjunctivae, PERRL, hearing intact to voice, moist mucous membranes  Neck:  Supple, without masses, thyroid non-tender  Resp:  No accessory muscle use, clear breath sounds without wheezes rales or rhonchi  Card:  No murmurs, normal S1, S2 without thrills, bruits or peripheral edema  Abd:  Soft, non-tender, non-distended, normoactive bowel sounds are present  Musc:  No cyanosis or clubbing  Skin:  No rashes or ulcers, skin turgor is good  Neuro:  Cranial nerves 3-12 are grossly intact,  strength is 5/5 bilaterally and dorsi / plantarflexion is 5/5 bilaterally, follows commands appropriately  Psych:  Good insight, oriented to person, place and time, alert  L knee TTP but no warmth or erythema     Medications Reviewed: (see below)    Lab Data Reviewed: (see below)    ______________________________________________________________________    Medications:     Current Facility-Administered Medications   Medication Dose Route Frequency    atenoloL (TENORMIN) tablet 50 mg  50 mg Oral DAILY    [Held by provider] valsartan (DIOVAN) tablet 160 mg  160 mg Oral DAILY    sodium chloride (NS) flush 5-40 mL  5-40 mL IntraVENous Q8H    sodium chloride (NS) flush 5-40 mL  5-40 mL IntraVENous PRN    acetaminophen (TYLENOL) tablet 650 mg  650 mg Oral Q6H PRN    Or    acetaminophen (TYLENOL) suppository 650 mg  650 mg Rectal Q6H PRN    polyethylene glycol (MIRALAX) packet 17 g  17 g Oral DAILY PRN    ondansetron (ZOFRAN ODT) tablet 4 mg  4 mg Oral Q8H PRN    Or    ondansetron (ZOFRAN) injection 4 mg  4 mg IntraVENous Q6H PRN    cefepime (MAXIPIME) 2 g in 0.9% sodium chloride (MBP/ADV) 100 mL MBP  2 g IntraVENous Q12H    guaiFENesin (ROBITUSSIN) 100 mg/5 mL oral liquid 100 mg  100 mg Oral Q4H PRN    insulin lispro (HUMALOG) injection   SubCUTAneous AC&HS    glucose chewable tablet 16 g  4 Tablet Oral PRN    glucagon (GLUCAGEN) injection 1 mg  1 mg IntraMUSCular PRN    dextrose 10% infusion 0-250 mL  0-250 mL IntraVENous PRN    0.9% sodium chloride with KCl 40 mEq/L infusion   IntraVENous CONTINUOUS    ibuprofen (MOTRIN) tablet 600 mg  600 mg Oral Q6H PRN    insulin NPH (NOVOLIN N, HUMULIN N) injection 20 Units  20 Units SubCUTAneous DAILY    guaiFENesin ER (MUCINEX) tablet 600 mg  600 mg Oral Q12H    sodium chloride (NS) flush 5-10 mL  5-10 mL IntraVENous PRN            Lab Review:     Recent Labs     08/26/22  0300 08/25/22 1954   WBC 11.4* 14.0*   HGB 10.1* 11.9   HCT 29.8* 36.1    380     Recent Labs     08/26/22  0300 08/25/22 1954    133*   K 3.2* 3.3*    96*   CO2 27 30   * 292*   BUN 12 17   CREA 0.64 1.04*   CA 8.3* 9.5   ALB 2.3* 3.1*   ALT 14 15     No components found for: GLPOC         Assessment / Plan:   Sepsis - 2/2 bacteremia and UTI. Blood cultures flagged (+). Organism not yet identified.  Urine cultures with possible staph  -repeat blood cultures -follow cultures to speciation   -resume vanco   -on cefepime   -CXR without acute process  -check TTE as suspect blood cultures will grow staph as urine has   -CT ab/pelvis without acute process  -will likely need ID eval in the AM     DM   -ISS   -BG checks AC TID and qHS  -on 35u 70/30 in the AM; A1c > 10   -will start weight based NPH as clearly based on A1c DM not well controlled     HTN   -on atenolol   -if BP stable in the AM resume ARB     Hypokalemia   -replete     Total time spent with patient: 39 [phone] [address] Stevens discussed with: Patient and Family    Discussed:  Care Plan    Prophylaxis:  Lovenox    Disposition:  Home w/Family           ___________________________________________________    Attending Physician: Artie Chapman MD

## 2022-08-26 NOTE — PROGRESS NOTES
Patient received from [place] [id number] with diagnosis of sepsis. PtMumtaz Jolly Can with left leg pain and blood in urine. Doctor notified that patient is on the floor.

## 2022-08-26 NOTE — PROGRESS NOTES
Pharmacy changed cefepime to 2 grams IV q12H, for CrCl of 56 ml/min. Treating UTI and sepsis. Will follow daily.

## 2022-08-26 NOTE — PROGRESS NOTES
Problem: Falls - Risk of  Goal: *Absence of Falls  Description: Document Mark Sol Fall Risk and appropriate interventions in the flowsheet.   Outcome: Progressing Towards Goal  Note: Fall Risk Interventions:  Mobility Interventions: Bed/chair exit alarm, Patient to call before getting OOB, PT Consult for mobility concerns         Medication Interventions: Bed/chair exit alarm, Evaluate medications/consider consulting pharmacy, Patient to call before getting OOB, Teach patient to arise slowly    Elimination Interventions: Bed/chair exit alarm, Call light in reach              Problem: Patient Education: Go to Patient Education Activity  Goal: Patient/Family Education  Outcome: Progressing Towards Goal

## 2022-08-26 NOTE — PROGRESS NOTES
Reason for Admission:  Sepsis                   RUR Score:     7%                Plan for utilizing home health:     Patient is uninsured     PCP: First and Last name:  None   Name of Practice:    Are you a current patient: Yes/No:    Approximate date of last visit:    Can you participate in a virtual visit with your PCP:                     Current Advanced Directive/Advance Care Plan: Full Code    Healthcare Decision Maker:   Ezekiel Tompkins, daughter - [phone]                       Transition of Care Plan:      CM met with patient to begin discharge planning. Patient is from [facility] and visiting her daughter who had a baby last month. Patient is unable to confirm charted address and contact phone numbers. CM contacted patient's son-in-law and only listed emergency contact, Yamiletnayeli Connor (c: [phone]), in an effort to obtain additional information. CM unable to reach him or leave a vm. Reportedly patient's daughter is a pharmacist at [employer]. Patient states she is scheduled to return to [facility] on Monday.       CM to follow  Await further evaluation and response to treatment  Home with family when medically cleared  Family to transport at discharge    Care Management Interventions  Support Systems: Child(cony)  Confirm Follow Up Transport: Family  The Plan for Transition of Care is Related to the Following Treatment Goals : Sepsis  Discharge Location  Patient Expects to be Discharged to[de-identified] Home with family assistance     Chioma Cuenca LCSW

## 2022-08-26 NOTE — ED NOTES
Attempted to call family member to advise pt has been picked up for transfer (per daughter request) however no answer and unable to leave voice message.

## 2022-08-27 ENCOUNTER — ANESTHESIA EVENT (OUTPATIENT)
Dept: SURGERY | Age: 67
DRG: 853 | End: 2022-08-27

## 2022-08-27 ENCOUNTER — APPOINTMENT (OUTPATIENT)
Dept: GENERAL RADIOLOGY | Age: 67
DRG: 853 | End: 2022-08-27
Attending: ORTHOPAEDIC SURGERY

## 2022-08-27 ENCOUNTER — ANESTHESIA (OUTPATIENT)
Dept: SURGERY | Age: 67
DRG: 853 | End: 2022-08-27

## 2022-08-27 ENCOUNTER — APPOINTMENT (OUTPATIENT)
Dept: GENERAL RADIOLOGY | Age: 67
DRG: 853 | End: 2022-08-27
Attending: INTERNAL MEDICINE

## 2022-08-27 ENCOUNTER — APPOINTMENT (OUTPATIENT)
Dept: NON INVASIVE DIAGNOSTICS | Age: 67
DRG: 853 | End: 2022-08-27
Attending: INTERNAL MEDICINE
Payer: MEDICAID

## 2022-08-27 LAB
ANION GAP SERPL CALC-SCNC: 6 MMOL/L (ref 5–15)
APPEARANCE SNV: ABNORMAL
BACTERIA SPEC CULT: NORMAL
BACTERIA SPEC CULT: NORMAL
BASOPHILS # BLD: 0.1 K/UL (ref 0–0.1)
BASOPHILS NFR BLD: 1 % (ref 0–1)
BODY FLD TYPE: NORMAL
BUN SERPL-MCNC: 8 MG/DL (ref 6–20)
BUN/CREAT SERPL: 13 (ref 12–20)
CALCIUM SERPL-MCNC: 8.9 MG/DL (ref 8.5–10.1)
CHLORIDE SERPL-SCNC: 106 MMOL/L (ref 97–108)
CO2 SERPL-SCNC: 29 MMOL/L (ref 21–32)
COLOR SNV: YELLOW
COMMENT, HOLDF: NORMAL
CREAT SERPL-MCNC: 0.61 MG/DL (ref 0.55–1.02)
CRP SERPL-MCNC: 18.2 MG/DL (ref 0–0.6)
CRYSTALS FLD MICRO: NORMAL
DIFFERENTIAL METHOD BLD: ABNORMAL
ECHO AO ARCH DIAM: 1.8 CM
ECHO AO ASC DIAM: 2.8 CM
ECHO AO ASCENDING AORTA INDEX: 1.5 CM/M2
ECHO AV AREA PEAK VELOCITY: 1.5 CM2
ECHO AV AREA PEAK VELOCITY: 1.5 CM2
ECHO AV AREA VTI: 1.5 CM2
ECHO AV AREA/BSA VTI: 0.8 CM2/M2
ECHO AV MEAN GRADIENT: 8 MMHG
ECHO AV MEAN VELOCITY: 1.3 M/S
ECHO AV PEAK GRADIENT: 16 MMHG
ECHO AV PEAK GRADIENT: 17 MMHG
ECHO AV PEAK VELOCITY: 2 M/S
ECHO AV PEAK VELOCITY: 2 M/S
ECHO AV VTI: 40.2 CM
ECHO EST RA PRESSURE: 3 MMHG
ECHO LA DIAMETER INDEX: 1.98 CM/M2
ECHO LA DIAMETER: 3.7 CM
ECHO LA VOL 2C: 37 ML (ref 22–52)
ECHO LA VOL 4C: 42 ML (ref 22–52)
ECHO LA VOL BP: 41 ML (ref 22–52)
ECHO LA VOL/BSA BIPLANE: 22 ML/M2 (ref 16–34)
ECHO LA VOLUME AREA LENGTH: 43 ML
ECHO LA VOLUME INDEX A2C: 20 ML/M2 (ref 16–34)
ECHO LA VOLUME INDEX A4C: 22 ML/M2 (ref 16–34)
ECHO LA VOLUME INDEX AREA LENGTH: 23 ML/M2 (ref 16–34)
ECHO LV E' LATERAL VELOCITY: 8 CM/S
ECHO LV E' SEPTAL VELOCITY: 9 CM/S
ECHO LV FRACTIONAL SHORTENING: 42 % (ref 28–44)
ECHO LV INTERNAL DIMENSION DIASTOLE INDEX: 2.3 CM/M2
ECHO LV INTERNAL DIMENSION DIASTOLIC: 4.3 CM (ref 3.9–5.3)
ECHO LV INTERNAL DIMENSION SYSTOLIC INDEX: 1.34 CM/M2
ECHO LV INTERNAL DIMENSION SYSTOLIC: 2.5 CM
ECHO LV IVSD: 0.7 CM (ref 0.6–0.9)
ECHO LV MASS 2D: 88.5 G (ref 67–162)
ECHO LV MASS INDEX 2D: 47.3 G/M2 (ref 43–95)
ECHO LV POSTERIOR WALL DIASTOLIC: 0.7 CM (ref 0.6–0.9)
ECHO LV RELATIVE WALL THICKNESS RATIO: 0.33
ECHO LVOT AREA: 2.3 CM2
ECHO LVOT AV VTI INDEX: 0.62
ECHO LVOT DIAM: 1.7 CM
ECHO LVOT MEAN GRADIENT: 3 MMHG
ECHO LVOT PEAK GRADIENT: 7 MMHG
ECHO LVOT PEAK VELOCITY: 1.3 M/S
ECHO LVOT STROKE VOLUME INDEX: 30.1 ML/M2
ECHO LVOT SV: 56.3 ML
ECHO LVOT VTI: 24.8 CM
ECHO MV A VELOCITY: 1.08 M/S
ECHO MV E DECELERATION TIME (DT): 250.9 MS
ECHO MV E VELOCITY: 1 M/S
ECHO MV E/A RATIO: 0.93
ECHO MV E/E' LATERAL: 12.5
ECHO MV E/E' RATIO (AVERAGED): 11.81
ECHO MV E/E' SEPTAL: 11.11
ECHO PULMONARY ARTERY END DIASTOLIC PRESSURE: 2 MMHG
ECHO PV MAX VELOCITY: 1.3 M/S
ECHO PV PEAK GRADIENT: 7 MMHG
ECHO PV REGURGITANT MAX VELOCITY: 0.7 M/S
ECHO RIGHT VENTRICULAR SYSTOLIC PRESSURE (RVSP): 34 MMHG
ECHO RV FREE WALL PEAK S': 13 CM/S
ECHO RV INTERNAL DIMENSION: 4.3 CM
ECHO RV TAPSE: 2.3 CM (ref 1.7–?)
ECHO TV REGURGITANT MAX VELOCITY: 2.78 M/S
ECHO TV REGURGITANT PEAK GRADIENT: 31 MMHG
EOSINOPHIL # BLD: 0.4 K/UL (ref 0–0.4)
EOSINOPHIL NFR BLD: 2 % (ref 0–7)
ERYTHROCYTE [DISTWIDTH] IN BLOOD BY AUTOMATED COUNT: 12.4 % (ref 11.5–14.5)
GLUCOSE BLD STRIP.AUTO-MCNC: 162 MG/DL (ref 65–117)
GLUCOSE BLD STRIP.AUTO-MCNC: 163 MG/DL (ref 65–117)
GLUCOSE BLD STRIP.AUTO-MCNC: 187 MG/DL (ref 65–117)
GLUCOSE BLD STRIP.AUTO-MCNC: 213 MG/DL (ref 65–117)
GLUCOSE BLD STRIP.AUTO-MCNC: 246 MG/DL (ref 65–117)
GLUCOSE BLD STRIP.AUTO-MCNC: 77 MG/DL (ref 65–117)
GLUCOSE SERPL-MCNC: 217 MG/DL (ref 65–100)
HCT VFR BLD AUTO: 32.7 % (ref 35–47)
HGB BLD-MCNC: 10.8 G/DL (ref 11.5–16)
IMM GRANULOCYTES # BLD AUTO: 0.3 K/UL (ref 0–0.04)
IMM GRANULOCYTES NFR BLD AUTO: 2 % (ref 0–0.5)
LYMPHOCYTES # BLD: 2.7 K/UL (ref 0.8–3.5)
LYMPHOCYTES NFR BLD: 16 % (ref 12–49)
LYMPHOCYTES NFR SNV MANUAL: 4 % (ref 0–15)
MCH RBC QN AUTO: 26.9 PG (ref 26–34)
MCHC RBC AUTO-ENTMCNC: 33 G/DL (ref 30–36.5)
MCV RBC AUTO: 81.5 FL (ref 80–99)
MONOCYTES # BLD: 1.7 K/UL (ref 0–1)
MONOCYTES NFR BLD: 10 % (ref 5–13)
MONOCYTES NFR SNV MANUAL: 2 % (ref 0–65)
NEUTROPHILS NFR SNV MANUAL: 94 % (ref 0–20)
NEUTS SEG # BLD: 11.7 K/UL (ref 1.8–8)
NEUTS SEG NFR BLD: 69 % (ref 32–75)
NRBC # BLD: 0 K/UL (ref 0–0.01)
NRBC BLD-RTO: 0 PER 100 WBC
PLATELET # BLD AUTO: 400 K/UL (ref 150–400)
PMV BLD AUTO: 9.4 FL (ref 8.9–12.9)
POTASSIUM SERPL-SCNC: 3.4 MMOL/L (ref 3.5–5.1)
RBC # BLD AUTO: 4.01 M/UL (ref 3.8–5.2)
RBC # SNV: >100 /CU MM
SAMPLES BEING HELD,HOLD: NORMAL
SERVICE CMNT-IMP: ABNORMAL
SERVICE CMNT-IMP: NORMAL
SERVICE CMNT-IMP: NORMAL
SODIUM SERPL-SCNC: 141 MMOL/L (ref 136–145)
SPECIMEN SOURCE FLD: ABNORMAL
WBC # BLD AUTO: 16.8 K/UL (ref 3.6–11)
WBC # SNV: ABNORMAL /CU MM (ref 0–150)

## 2022-08-27 PROCEDURE — 74011250636 HC RX REV CODE- 250/636: Performed by: INTERNAL MEDICINE

## 2022-08-27 PROCEDURE — 74011250636 HC RX REV CODE- 250/636: Performed by: NURSE ANESTHETIST, CERTIFIED REGISTERED

## 2022-08-27 PROCEDURE — 74011250637 HC RX REV CODE- 250/637: Performed by: STUDENT IN AN ORGANIZED HEALTH CARE EDUCATION/TRAINING PROGRAM

## 2022-08-27 PROCEDURE — 0S9D0ZZ DRAINAGE OF LEFT KNEE JOINT, OPEN APPROACH: ICD-10-PCS | Performed by: ORTHOPAEDIC SURGERY

## 2022-08-27 PROCEDURE — 36415 COLL VENOUS BLD VENIPUNCTURE: CPT

## 2022-08-27 PROCEDURE — 89050 BODY FLUID CELL COUNT: CPT

## 2022-08-27 PROCEDURE — 74011000250 HC RX REV CODE- 250: Performed by: INTERNAL MEDICINE

## 2022-08-27 PROCEDURE — 87077 CULTURE AEROBIC IDENTIFY: CPT

## 2022-08-27 PROCEDURE — 87205 SMEAR GRAM STAIN: CPT

## 2022-08-27 PROCEDURE — 65270000029 HC RM PRIVATE

## 2022-08-27 PROCEDURE — 85025 COMPLETE CBC W/AUTO DIFF WBC: CPT

## 2022-08-27 PROCEDURE — 74011000258 HC RX REV CODE- 258: Performed by: INTERNAL MEDICINE

## 2022-08-27 PROCEDURE — 80048 BASIC METABOLIC PNL TOTAL CA: CPT

## 2022-08-27 PROCEDURE — 74011250636 HC RX REV CODE- 250/636: Performed by: ANESTHESIOLOGY

## 2022-08-27 PROCEDURE — 74011636637 HC RX REV CODE- 636/637: Performed by: INTERNAL MEDICINE

## 2022-08-27 PROCEDURE — 74011250637 HC RX REV CODE- 250/637: Performed by: INTERNAL MEDICINE

## 2022-08-27 PROCEDURE — 93306 TTE W/DOPPLER COMPLETE: CPT | Performed by: SPECIALIST

## 2022-08-27 PROCEDURE — 76060000032 HC ANESTHESIA 0.5 TO 1 HR: Performed by: ORTHOPAEDIC SURGERY

## 2022-08-27 PROCEDURE — 77030020143 HC AIRWY LARYN INTUB CGAS -A: Performed by: ANESTHESIOLOGY

## 2022-08-27 PROCEDURE — 93306 TTE W/DOPPLER COMPLETE: CPT

## 2022-08-27 PROCEDURE — 86140 C-REACTIVE PROTEIN: CPT

## 2022-08-27 PROCEDURE — 77030002933 HC SUT MCRYL J&J -A: Performed by: ORTHOPAEDIC SURGERY

## 2022-08-27 PROCEDURE — 2709999900 HC NON-CHARGEABLE SUPPLY: Performed by: ORTHOPAEDIC SURGERY

## 2022-08-27 PROCEDURE — 77030011267 HC ELECTRD BLD COVD -A: Performed by: ORTHOPAEDIC SURGERY

## 2022-08-27 PROCEDURE — 74011000250 HC RX REV CODE- 250: Performed by: NURSE ANESTHETIST, CERTIFIED REGISTERED

## 2022-08-27 PROCEDURE — 76210000006 HC OR PH I REC 0.5 TO 1 HR: Performed by: ORTHOPAEDIC SURGERY

## 2022-08-27 PROCEDURE — 82962 GLUCOSE BLOOD TEST: CPT

## 2022-08-27 PROCEDURE — 77030038552 HC DRN WND MDII -A: Performed by: ORTHOPAEDIC SURGERY

## 2022-08-27 PROCEDURE — 71045 X-RAY EXAM CHEST 1 VIEW: CPT

## 2022-08-27 PROCEDURE — 87075 CULTR BACTERIA EXCEPT BLOOD: CPT

## 2022-08-27 PROCEDURE — 73560 X-RAY EXAM OF KNEE 1 OR 2: CPT

## 2022-08-27 PROCEDURE — 74011000250 HC RX REV CODE- 250: Performed by: STUDENT IN AN ORGANIZED HEALTH CARE EDUCATION/TRAINING PROGRAM

## 2022-08-27 PROCEDURE — 87186 SC STD MICRODIL/AGAR DIL: CPT

## 2022-08-27 PROCEDURE — 76010000138 HC OR TIME 0.5 TO 1 HR: Performed by: ORTHOPAEDIC SURGERY

## 2022-08-27 PROCEDURE — 89060 EXAM SYNOVIAL FLUID CRYSTALS: CPT

## 2022-08-27 PROCEDURE — 77030008463 HC STPLR SKN PROX J&J -B: Performed by: ORTHOPAEDIC SURGERY

## 2022-08-27 PROCEDURE — 74011000250 HC RX REV CODE- 250: Performed by: ORTHOPAEDIC SURGERY

## 2022-08-27 RX ORDER — SODIUM CHLORIDE 0.9 % (FLUSH) 0.9 %
5-40 SYRINGE (ML) INJECTION AS NEEDED
Status: DISCONTINUED | OUTPATIENT
Start: 2022-08-27 | End: 2022-08-27

## 2022-08-27 RX ORDER — OXYCODONE HYDROCHLORIDE 5 MG/1
5 TABLET ORAL AS NEEDED
Status: DISCONTINUED | OUTPATIENT
Start: 2022-08-27 | End: 2022-08-28 | Stop reason: HOSPADM

## 2022-08-27 RX ORDER — HYDROMORPHONE HYDROCHLORIDE 1 MG/ML
.25-1 INJECTION, SOLUTION INTRAMUSCULAR; INTRAVENOUS; SUBCUTANEOUS
Status: DISCONTINUED | OUTPATIENT
Start: 2022-08-27 | End: 2022-08-28 | Stop reason: HOSPADM

## 2022-08-27 RX ORDER — DEXAMETHASONE SODIUM PHOSPHATE 4 MG/ML
INJECTION, SOLUTION INTRA-ARTICULAR; INTRALESIONAL; INTRAMUSCULAR; INTRAVENOUS; SOFT TISSUE AS NEEDED
Status: DISCONTINUED | OUTPATIENT
Start: 2022-08-27 | End: 2022-08-27 | Stop reason: HOSPADM

## 2022-08-27 RX ORDER — ONDANSETRON 2 MG/ML
4 INJECTION INTRAMUSCULAR; INTRAVENOUS AS NEEDED
Status: DISCONTINUED | OUTPATIENT
Start: 2022-08-27 | End: 2022-08-28 | Stop reason: HOSPADM

## 2022-08-27 RX ORDER — NALOXONE HYDROCHLORIDE 0.4 MG/ML
0.2 INJECTION, SOLUTION INTRAMUSCULAR; INTRAVENOUS; SUBCUTANEOUS
Status: DISCONTINUED | OUTPATIENT
Start: 2022-08-27 | End: 2022-08-28 | Stop reason: HOSPADM

## 2022-08-27 RX ORDER — MIDAZOLAM HYDROCHLORIDE 1 MG/ML
INJECTION, SOLUTION INTRAMUSCULAR; INTRAVENOUS AS NEEDED
Status: DISCONTINUED | OUTPATIENT
Start: 2022-08-27 | End: 2022-08-27 | Stop reason: HOSPADM

## 2022-08-27 RX ORDER — LIDOCAINE HYDROCHLORIDE 10 MG/ML
0.1 INJECTION, SOLUTION EPIDURAL; INFILTRATION; INTRACAUDAL; PERINEURAL AS NEEDED
Status: DISCONTINUED | OUTPATIENT
Start: 2022-08-27 | End: 2022-08-27

## 2022-08-27 RX ORDER — SODIUM CHLORIDE, SODIUM LACTATE, POTASSIUM CHLORIDE, CALCIUM CHLORIDE 600; 310; 30; 20 MG/100ML; MG/100ML; MG/100ML; MG/100ML
INJECTION, SOLUTION INTRAVENOUS
Status: DISCONTINUED | OUTPATIENT
Start: 2022-08-27 | End: 2022-08-27 | Stop reason: HOSPADM

## 2022-08-27 RX ORDER — SODIUM CHLORIDE, SODIUM LACTATE, POTASSIUM CHLORIDE, CALCIUM CHLORIDE 600; 310; 30; 20 MG/100ML; MG/100ML; MG/100ML; MG/100ML
100 INJECTION, SOLUTION INTRAVENOUS CONTINUOUS
Status: DISCONTINUED | OUTPATIENT
Start: 2022-08-27 | End: 2022-08-28 | Stop reason: HOSPADM

## 2022-08-27 RX ORDER — VALSARTAN 80 MG/1
320 TABLET ORAL DAILY
Status: DISCONTINUED | OUTPATIENT
Start: 2022-08-28 | End: 2022-08-29

## 2022-08-27 RX ORDER — SODIUM CHLORIDE 0.9 % (FLUSH) 0.9 %
5-40 SYRINGE (ML) INJECTION AS NEEDED
Status: DISCONTINUED | OUTPATIENT
Start: 2022-08-27 | End: 2022-08-28 | Stop reason: HOSPADM

## 2022-08-27 RX ORDER — PROPOFOL 10 MG/ML
INJECTION, EMULSION INTRAVENOUS AS NEEDED
Status: DISCONTINUED | OUTPATIENT
Start: 2022-08-27 | End: 2022-08-27 | Stop reason: HOSPADM

## 2022-08-27 RX ORDER — SODIUM CHLORIDE, SODIUM LACTATE, POTASSIUM CHLORIDE, CALCIUM CHLORIDE 600; 310; 30; 20 MG/100ML; MG/100ML; MG/100ML; MG/100ML
125 INJECTION, SOLUTION INTRAVENOUS CONTINUOUS
Status: DISCONTINUED | OUTPATIENT
Start: 2022-08-28 | End: 2022-08-27

## 2022-08-27 RX ORDER — DIPHENHYDRAMINE HYDROCHLORIDE 50 MG/ML
12.5 INJECTION, SOLUTION INTRAMUSCULAR; INTRAVENOUS AS NEEDED
Status: ACTIVE | OUTPATIENT
Start: 2022-08-27 | End: 2022-08-27

## 2022-08-27 RX ORDER — FLUMAZENIL 0.1 MG/ML
0.2 INJECTION INTRAVENOUS
Status: DISCONTINUED | OUTPATIENT
Start: 2022-08-27 | End: 2022-08-27

## 2022-08-27 RX ORDER — SODIUM CHLORIDE 0.9 % (FLUSH) 0.9 %
5-40 SYRINGE (ML) INJECTION EVERY 8 HOURS
Status: DISCONTINUED | OUTPATIENT
Start: 2022-08-28 | End: 2022-08-27

## 2022-08-27 RX ORDER — FAMOTIDINE 10 MG/ML
INJECTION INTRAVENOUS AS NEEDED
Status: DISCONTINUED | OUTPATIENT
Start: 2022-08-27 | End: 2022-08-27 | Stop reason: HOSPADM

## 2022-08-27 RX ORDER — LABETALOL HCL 20 MG/4 ML
10 SYRINGE (ML) INTRAVENOUS
Status: DISCONTINUED | OUTPATIENT
Start: 2022-08-27 | End: 2022-08-28 | Stop reason: HOSPADM

## 2022-08-27 RX ORDER — SODIUM CHLORIDE 0.9 % (FLUSH) 0.9 %
5-40 SYRINGE (ML) INJECTION EVERY 8 HOURS
Status: DISCONTINUED | OUTPATIENT
Start: 2022-08-27 | End: 2022-08-28 | Stop reason: HOSPADM

## 2022-08-27 RX ORDER — LIDOCAINE HYDROCHLORIDE 20 MG/ML
INJECTION, SOLUTION EPIDURAL; INFILTRATION; INTRACAUDAL; PERINEURAL AS NEEDED
Status: DISCONTINUED | OUTPATIENT
Start: 2022-08-27 | End: 2022-08-27 | Stop reason: HOSPADM

## 2022-08-27 RX ORDER — ONDANSETRON 2 MG/ML
INJECTION INTRAMUSCULAR; INTRAVENOUS AS NEEDED
Status: DISCONTINUED | OUTPATIENT
Start: 2022-08-27 | End: 2022-08-27 | Stop reason: HOSPADM

## 2022-08-27 RX ORDER — FENTANYL CITRATE 50 UG/ML
INJECTION, SOLUTION INTRAMUSCULAR; INTRAVENOUS AS NEEDED
Status: DISCONTINUED | OUTPATIENT
Start: 2022-08-27 | End: 2022-08-27 | Stop reason: HOSPADM

## 2022-08-27 RX ADMIN — GUAIFENESIN 600 MG: 600 TABLET ORAL at 09:46

## 2022-08-27 RX ADMIN — FAMOTIDINE 20 MG: 10 INJECTION INTRAVENOUS at 21:05

## 2022-08-27 RX ADMIN — VANCOMYCIN HYDROCHLORIDE 1000 MG: 1 INJECTION, POWDER, LYOPHILIZED, FOR SOLUTION INTRAVENOUS at 17:17

## 2022-08-27 RX ADMIN — HYDROMORPHONE HYDROCHLORIDE 0.5 MG: 1 INJECTION, SOLUTION INTRAMUSCULAR; INTRAVENOUS; SUBCUTANEOUS at 22:38

## 2022-08-27 RX ADMIN — Medication 10 ML: at 05:57

## 2022-08-27 RX ADMIN — LABETALOL HYDROCHLORIDE 10 MG: 5 INJECTION, SOLUTION INTRAVENOUS at 23:16

## 2022-08-27 RX ADMIN — ENOXAPARIN SODIUM 40 MG: 100 INJECTION SUBCUTANEOUS at 09:46

## 2022-08-27 RX ADMIN — INSULIN HUMAN 20 UNITS: 100 INJECTION, SUSPENSION SUBCUTANEOUS at 17:17

## 2022-08-27 RX ADMIN — Medication 2 UNITS: at 09:46

## 2022-08-27 RX ADMIN — LIDOCAINE HYDROCHLORIDE 60 MG: 20 INJECTION, SOLUTION EPIDURAL; INFILTRATION; INTRACAUDAL; PERINEURAL at 21:17

## 2022-08-27 RX ADMIN — Medication 10 ML: at 15:04

## 2022-08-27 RX ADMIN — Medication 3 UNITS: at 11:30

## 2022-08-27 RX ADMIN — HYDROMORPHONE HYDROCHLORIDE 0.5 MG: 1 INJECTION, SOLUTION INTRAMUSCULAR; INTRAVENOUS; SUBCUTANEOUS at 22:52

## 2022-08-27 RX ADMIN — FENTANYL CITRATE 50 MCG: 50 INJECTION, SOLUTION INTRAMUSCULAR; INTRAVENOUS at 21:45

## 2022-08-27 RX ADMIN — MIDAZOLAM HYDROCHLORIDE 2 MG: 2 INJECTION, SOLUTION INTRAMUSCULAR; INTRAVENOUS at 21:05

## 2022-08-27 RX ADMIN — VALSARTAN 160 MG: 80 TABLET, FILM COATED ORAL at 09:46

## 2022-08-27 RX ADMIN — FENTANYL CITRATE 50 MCG: 50 INJECTION, SOLUTION INTRAMUSCULAR; INTRAVENOUS at 21:07

## 2022-08-27 RX ADMIN — IBUPROFEN 600 MG: 600 TABLET, FILM COATED ORAL at 01:30

## 2022-08-27 RX ADMIN — ATENOLOL 50 MG: 50 TABLET ORAL at 09:46

## 2022-08-27 RX ADMIN — SODIUM CHLORIDE, SODIUM LACTATE, POTASSIUM CHLORIDE, AND CALCIUM CHLORIDE: 600; 310; 30; 20 INJECTION, SOLUTION INTRAVENOUS at 21:05

## 2022-08-27 RX ADMIN — PROPOFOL 200 MG: 10 INJECTION, EMULSION INTRAVENOUS at 21:17

## 2022-08-27 RX ADMIN — ACETAMINOPHEN 650 MG: 325 TABLET, FILM COATED ORAL at 09:46

## 2022-08-27 RX ADMIN — VANCOMYCIN HYDROCHLORIDE 1000 MG: 1 INJECTION, POWDER, LYOPHILIZED, FOR SOLUTION INTRAVENOUS at 05:57

## 2022-08-27 RX ADMIN — LABETALOL HYDROCHLORIDE 10 MG: 5 INJECTION, SOLUTION INTRAVENOUS at 22:48

## 2022-08-27 RX ADMIN — CEFEPIME 2 G: 2 INJECTION, POWDER, FOR SOLUTION INTRAVENOUS at 17:17

## 2022-08-27 RX ADMIN — CEFEPIME 2 G: 2 INJECTION, POWDER, FOR SOLUTION INTRAVENOUS at 01:31

## 2022-08-27 RX ADMIN — FENTANYL CITRATE 50 MCG: 50 INJECTION, SOLUTION INTRAMUSCULAR; INTRAVENOUS at 21:30

## 2022-08-27 RX ADMIN — ONDANSETRON HYDROCHLORIDE 4 MG: 2 SOLUTION INTRAMUSCULAR; INTRAVENOUS at 21:25

## 2022-08-27 RX ADMIN — INSULIN HUMAN 16 UNITS: 100 INJECTION, SUSPENSION SUBCUTANEOUS at 09:47

## 2022-08-27 RX ADMIN — FENTANYL CITRATE 50 MCG: 50 INJECTION, SOLUTION INTRAMUSCULAR; INTRAVENOUS at 21:17

## 2022-08-27 RX ADMIN — CEFEPIME 2 G: 2 INJECTION, POWDER, FOR SOLUTION INTRAVENOUS at 09:46

## 2022-08-27 RX ADMIN — DEXAMETHASONE SODIUM PHOSPHATE 4 MG: 4 INJECTION, SOLUTION INTRAMUSCULAR; INTRAVENOUS at 21:25

## 2022-08-27 RX ADMIN — DEXTROSE MONOHYDRATE 125 ML: 100 INJECTION, SOLUTION INTRAVENOUS at 22:34

## 2022-08-27 RX ADMIN — POTASSIUM CHLORIDE AND SODIUM CHLORIDE: 900; 300 INJECTION, SOLUTION INTRAVENOUS at 09:47

## 2022-08-27 RX ADMIN — Medication 2 UNITS: at 17:17

## 2022-08-27 NOTE — ANESTHESIA PREPROCEDURE EVALUATION
Relevant Problems   No relevant active problems     Past Medical History:   Diagnosis Date    Diabetes ([facility] [address]     Hypertension        Anesthetic History   No history of anesthetic complications            Review of Systems / Medical History  Patient summary reviewed and pertinent labs reviewed    Pulmonary  Within defined limits                 Neuro/Psych   Within defined limits        Pertinent negatives: No seizures, TIA and CVA   Cardiovascular    Hypertension            Pertinent negatives: No past MI, angina and CHF  Exercise tolerance: >4 METS  Comments: TTE 8/27/22:      Left Ventricle: Normal left ventricular systolic function with a visually estimated EF of 60 - 65%. Left ventricle size is normal. Normal wall thickness. Normal wall motion.   Aortic Valve: Tricuspid valve.   Mitral Valve: Valve structure is normal. Mild annular calcification of the mitral valve. GI/Hepatic/Renal  Within defined limits           Pertinent negatives: No renal disease   Endo/Other  Within defined limits  Diabetes: using insulin         Other Findings            Physical Exam    Airway  Mallampati: II  TM Distance: 4 - 6 cm  Neck ROM: normal range of motion   Mouth opening: Normal     Cardiovascular    Rhythm: regular           Dental  No notable dental hx       Pulmonary  Breath sounds clear to auscultation               Abdominal  Abdominal exam normal       Other Findings            Anesthetic Plan    ASA: 2  Anesthesia type: general          Induction: Intravenous  Anesthetic plan and risks discussed with: Patient      Patient ate full lunch tray at 1300 today. Pt HD stable on no pressors, will wait until they are NPO at 2100 tonight.

## 2022-08-27 NOTE — PROGRESS NOTES
Jair Crenshaw [facility]  [address] [facility], [address] [place] [address]  [phone]      Medical Progress Note      NAME: Jose Carlos Fajardo   :  1955  MRM:  [id number]    Date/Time: 2022  2:48 PM         Subjective:     Chief Complaint: \"I'm okay\"     Pt seen and examined. L knee now with large effusion. This is new since . Discussed with patient and with daughter (via phone) re: (+) blood cultures, urine cultures, need for knee eval/arthrocentesis and TTE of heart to eval for vegetations     ROS:  (bold if positive, if negative)    Knee pain      Objective:       Vitals:          Last 24hrs VS reviewed since prior progress note. Most recent are:    Visit Vitals  BP (!) 160/78 (BP 1 Location: Left upper arm, BP Patient Position: At rest)   Pulse 79   Temp 98.7 °F (37.1 °C)   Resp 16   Ht 5' 5\" (1.651 m)   Wt 79.4 kg (175 lb 0.7 oz)   SpO2 100%   BMI 29.13 kg/m²     SpO2 Readings from Last 6 Encounters:   22 100%   22 98%          Intake/Output Summary (Last 24 hours) at 2022 1642  Last data filed at 2022 5039  Gross per 24 hour   Intake 700 ml   Output --   Net 700 ml          Exam:     Physical Exam:    Gen:  Well-developed, well-nourished, in no acute distress  HEENT:  Pink conjunctivae, PERRL, hearing intact to voice, moist mucous membranes  Neck:  Supple, without masses, thyroid non-tender  Resp:  No accessory muscle use, clear breath sounds without wheezes rales or rhonchi  Card:  No murmurs, normal S1, S2 without thrills, bruits or peripheral edema  Abd:  Soft, non-tender, non-distended, normoactive bowel sounds are present  Musc:  No cyanosis or clubbing  Skin:  No rashes or ulcers, skin turgor is good  Neuro:  Cranial nerves 3-12 are grossly intact,  strength is 5/5 bilaterally and dorsi / plantarflexion is 5/5 bilaterally, follows commands appropriately  Psych:  Good insight, oriented to person, place and time, alert  L knee TTP with new effusion. Not erythematous.  Had ice pack on     Medications Reviewed: (see below)    Lab Data Reviewed: (see below)    ______________________________________________________________________    Medications:     Current Facility-Administered Medications   Medication Dose Route Frequency    insulin NPH (NOVOLIN N, HUMULIN N) injection 20 Units  20 Units SubCUTAneous ACB&D    [START ON 8/28/2022] Vancomycin RANDOM level @0400 on 8/28/2022   Other ONCE    atenoloL (TENORMIN) tablet 50 mg  50 mg Oral DAILY    valsartan (DIOVAN) tablet 160 mg  160 mg Oral DAILY    sodium chloride (NS) flush 5-40 mL  5-40 mL IntraVENous Q8H    sodium chloride (NS) flush 5-40 mL  5-40 mL IntraVENous PRN    acetaminophen (TYLENOL) tablet 650 mg  650 mg Oral Q6H PRN    Or    acetaminophen (TYLENOL) suppository 650 mg  650 mg Rectal Q6H PRN    polyethylene glycol (MIRALAX) packet 17 g  17 g Oral DAILY PRN    ondansetron (ZOFRAN ODT) tablet 4 mg  4 mg Oral Q8H PRN    Or    ondansetron (ZOFRAN) injection 4 mg  4 mg IntraVENous Q6H PRN    guaiFENesin (ROBITUSSIN) 100 mg/5 mL oral liquid 100 mg  100 mg Oral Q4H PRN    insulin lispro (HUMALOG) injection   SubCUTAneous AC&HS    glucose chewable tablet 16 g  4 Tablet Oral PRN    glucagon (GLUCAGEN) injection 1 mg  1 mg IntraMUSCular PRN    dextrose 10% infusion 0-250 mL  0-250 mL IntraVENous PRN    0.9% sodium chloride with KCl 40 mEq/L infusion   IntraVENous CONTINUOUS    ibuprofen (MOTRIN) tablet 600 mg  600 mg Oral Q6H PRN    guaiFENesin ER (MUCINEX) tablet 600 mg  600 mg Oral Q12H    enoxaparin (LOVENOX) injection 40 mg  40 mg SubCUTAneous Q24H    cefepime (MAXIPIME) 2 g in 0.9% sodium chloride (MBP/ADV) 100 mL MBP  2 g IntraVENous Q8H    vancomycin (VANCOCIN) 1,000 mg in 0.9% sodium chloride 250 mL ([id number])  1,000 mg IntraVENous Q12H    sodium chloride (NS) flush 5-10 mL  5-10 mL IntraVENous PRN            Lab Review:     Recent Labs     08/27/22  0834 08/26/22  0300 08/25/22 1954   WBC 16.8* 11.4* 14.0*   HGB 10.8* 10.1* 11.9   HCT 32.7* 29.8* 36.1    322 380     Recent Labs     08/27/22  0834 08/26/22  0300 08/25/22 1954    141 133*   K 3.4* 3.2* 3.3*    108 96*   CO2 29 27 30   * 200* 292*   BUN 8 12 17   CREA 0.61 0.64 1.04*   CA 8.9 8.3* 9.5   ALB  --  2.3* 3.1*   ALT  --  14 15     No components found for: GLPOC         Assessment / Plan:   Sepsis - 2/2 bacteremia and UTI. Blood cultures flagged (+). Organism not yet identified. Urine cultures with possible staph  -repeat blood cultures pending collection   -follow cultures to speciation  -on cefe/vanco => de-escalate   -CXR without acute process  -TTE obtained   -CT ab/pelvis without acute process  -ortho consulted for arthrocentesis considering knee effusion   -ID eval appreciated     DM   -ISS   -BG checks AC TID and qHS  -on 35u 70/30 in the AM; A1c > 10   -increase NPH insulin     HTN   -on atenolol   -resume ARB; increase to 320mg considering BP     Hypokalemia   -replete PRN     Knee effusion - L. Only with pain on admission after exercising.  Now with L knee effusion  -ortho on board; appreciate assistance    Total time spent with patient: [phone] [address] discussed with: Patient and Family    Discussed:  Care Plan    Prophylaxis:  Lovenox    Disposition:  Home w/Family           ___________________________________________________    Attending Physician: Cara Tam MD

## 2022-08-27 NOTE — CONSULTS
Consult Date: 8/27/2022    IP CONSULT TO ORTHOPEDIC SURGERY  Consult performed by: Los Villareal PA-C  Consult ordered by: Norma Rahman MD        Subjective     74 y.o FM with DM, HTN reports LEFT knee pain of about a weeks duration with no injury. Her pain is worse over the ant knee and gets worse with WB and motion, and better in a resting position. She reports she had malaise and other constitutional symptoms beginning around wed of this wek. She was admitted on 8/25/22 for UTI and has been on IV abx, ortho was consulted to evaluate her LEFT knee effusion in the setting of bacteremia    Past Medical History:   Diagnosis Date    Diabetes ([facility] [address]     Hypertension       Past Surgical History:   Procedure Laterality Date    HX GYN       History reviewed. No pertinent family history.    Social History     Tobacco Use    Smoking status: Not on file    Smokeless tobacco: Not on file   Substance Use Topics    Alcohol use: Not on file       Current Facility-Administered Medications   Medication Dose Route Frequency Provider Last Rate Last Admin    insulin NPH (NOVOLIN N, HUMULIN N) injection 20 Units  20 Units SubCUTAneous ACB&D Norma Rahman MD        atenoloL (TENORMIN) tablet 50 mg  50 mg Oral DAILY Norma Rahman MD   50 mg at 08/27/22 0946    valsartan (DIOVAN) tablet 160 mg  160 mg Oral DAILY Christos Padilla MD   160 mg at 08/27/22 0946    sodium chloride (NS) flush 5-40 mL  5-40 mL IntraVENous Q8H Christos Padilla MD   10 mL at 08/27/22 0557    sodium chloride (NS) flush 5-40 mL  5-40 mL IntraVENous PRN Christos Padilla MD        acetaminophen (TYLENOL) tablet 650 mg  650 mg Oral Q6H PRN Christos Padilla MD   650 mg at 08/27/22 0946    Or    acetaminophen (TYLENOL) suppository 650 mg  650 mg Rectal Q6H PRN Christos Padilla MD        polyethylene glycol (MIRALAX) packet 17 g  17 g Oral DAILY PRN Christos Padilla MD        ondansetron (ZOFRAN ODT) tablet 4 mg  4 mg Oral Q8H PRN Christos Padilla MD        Or    ondansetron (ZOFRAN) injection 4 mg  4 mg IntraVENous Q6H PRN Christos Padilla MD        guaiFENesin (ROBITUSSIN) 100 mg/5 mL oral liquid 100 mg  100 mg Oral Q4H PRN Fifi Constantino MD   100 mg at 08/26/22 0440    insulin lispro (HUMALOG) injection   SubCUTAneous AC&HS Christie Vinson MD   2 Units at 08/27/22 0946    glucose chewable tablet 16 g  4 Tablet Oral PRN Drea Torres MD        glucagon (GLUCAGEN) injection 1 mg  1 mg IntraMUSCular PRN Christie Vinson MD        dextrose 10% infusion 0-250 mL  0-250 mL IntraVENous PRN Christie Vinson MD        0.9% sodium chloride with KCl 40 mEq/L infusion   IntraVENous CONTINUOUS Christie Vinson MD 75 mL/hr at 08/27/22 0947 New Bag at 08/27/22 0947    ibuprofen (MOTRIN) tablet 600 mg  600 mg Oral Q6H PRN Christie Visnon MD   600 mg at 08/27/22 0130    guaiFENesin ER (MUCINEX) tablet 600 mg  600 mg Oral Q12H Mala KAISER MD   600 mg at 08/27/22 0946    enoxaparin (LOVENOX) injection 40 mg  40 mg SubCUTAneous Q24H Christie Vinson MD   40 mg at 08/27/22 0946    cefepime (MAXIPIME) 2 g in 0.9% sodium chloride (MBP/ADV) 100 mL MBP  2 g IntraVENous Q8H Christie Vinson MD 25 mL/hr at 08/27/22 0946 2 g at 08/27/22 0946    vancomycin (VANCOCIN) 1,000 mg in 0.9% sodium chloride 250 mL ([id number])  1,000 mg IntraVENous Q12H Christie Vinson  mL/hr at 08/27/22 0557 1,000 mg at 08/27/22 0557    sodium chloride (NS) flush 5-10 mL  5-10 mL IntraVENous PRN Nba Borja MD            Review of Systems    Objective     Vital signs for last 24 hours:  Visit Vitals  BP (!) 160/78 (BP 1 Location: Left upper arm, BP Patient Position: At rest)   Pulse 79   Temp 98.7 °F (37.1 °C)   Resp 16   Ht 5' 5\" (1.651 m)   Wt 79.4 kg (175 lb 0.7 oz)   SpO2 100%   BMI 29.13 kg/m²       Intake/Output this shift:  Current Shift: No intake/output data recorded.   Last 3 Shifts: 08/25 1901 - 08/27 0700  In: 3200 [I.V.:3200]  Out: -     Data Review:   Recent Results (from the past 24 hour(s))   RESPIRATORY VIRUS PANEL W/COVID-19, PCR    Collection Time: 08/26/22  4:00 PM    Specimen: Nasopharyngeal   Result Value Ref Range    Adenovirus Not detected NOTD      Coronavirus 229E Not detected NOTD      Coronavirus HKU1 Not detected NOTD      Coronavirus CVNL63 Not detected NOTD      Coronavirus OC43 Not detected NOTD      SARS-CoV-2, PCR Not detected NOTD      Metapneumovirus Not detected NOTD      Rhinovirus and Enterovirus Not detected NOTD      Influenza A Not detected NOTD      Influenza B Not detected NOTD      Parainfluenza 1 Not detected NOTD      Parainfluenza 2 Not detected NOTD      Parainfluenza 3 Not detected NOTD      Parainfluenza virus 4 Not detected NOTD      RSV by PCR Not detected NOTD      B. parapertussis, PCR Not detected NOTD      Bordetella pertussis - PCR Not detected NOTD      Chlamydophila pneumoniae DNA, QL, PCR Not detected NOTD      Mycoplasma pneumoniae DNA, QL, PCR Not detected NOTD     GLUCOSE, POC    Collection Time: 08/26/22  6:08 PM   Result Value Ref Range    Glucose (POC) 182 (H) 65 - 117 mg/dL    Performed by [address], POC    Collection Time: 08/26/22  9:44 PM   Result Value Ref Range    Glucose (POC) 273 (H) 65 - 117 mg/dL    Performed by Ene Paredes    GLUCOSE, POC    Collection Time: 08/27/22  5:10 AM   Result Value Ref Range    Glucose (POC) 213 (H) 65 - 117 mg/dL    Performed by Ene Paredes    GLUCOSE, POC    Collection Time: 08/27/22  7:49 AM   Result Value Ref Range    Glucose (POC) 187 (H) 65 - 117 mg/dL    Performed by Jason Priest    CBC WITH AUTOMATED DIFF    Collection Time: 08/27/22  8:34 AM   Result Value Ref Range    WBC 16.8 (H) 3.6 - 11.0 K/uL    RBC 4.01 3.80 - 5.20 M/uL    HGB 10.8 (L) 11.5 - 16.0 g/dL    HCT 32.7 (L) 35.0 - 47.0 %    MCV 81.5 80.0 - 99.0 FL    MCH 26.9 26.0 - 34.0 PG    MCHC 33.0 30.0 - 36.5 g/dL    RDW 12.4 11.5 - 14.5 %    PLATELET 764 001 - 480 K/uL    MPV 9.4 8.9 - 12.9 FL    NRBC 0.0 0  WBC    ABSOLUTE NRBC 0.00 0.00 - 0.01 K/uL    NEUTROPHILS 69 32 - 75 %    LYMPHOCYTES 16 12 - 49 %    MONOCYTES 10 5 - 13 %    EOSINOPHILS 2 0 - 7 %    BASOPHILS 1 0 - 1 %    IMMATURE GRANULOCYTES 2 (H) 0.0 - 0.5 %    ABS. NEUTROPHILS 11.7 (H) 1.8 - 8.0 K/UL    ABS. LYMPHOCYTES 2.7 0.8 - 3.5 K/UL    ABS. MONOCYTES 1.7 (H) 0.0 - 1.0 K/UL    ABS. EOSINOPHILS 0.4 0.0 - 0.4 K/UL    ABS. BASOPHILS 0.1 0.0 - 0.1 K/UL    ABS. IMM. GRANS. 0.3 (H) 0.00 - 0.04 K/UL    DF AUTOMATED     METABOLIC PANEL, BASIC    Collection Time: 08/27/22  8:34 AM   Result Value Ref Range    Sodium 141 136 - 145 mmol/L    Potassium 3.4 (L) 3.5 - 5.1 mmol/L    Chloride 106 97 - 108 mmol/L    CO2 29 21 - 32 mmol/L    Anion gap 6 5 - 15 mmol/L    Glucose 217 (H) 65 - 100 mg/dL    BUN 8 6 - 20 MG/DL    Creatinine 0.61 0.55 - 1.02 MG/DL    BUN/Creatinine ratio 13 12 - 20      GFR est AA >60 >60 ml/min/1.73m2    GFR est non-AA >60 >60 ml/min/1.73m2    Calcium 8.9 8.5 - 10.1 MG/DL   SAMPLES BEING HELD    Collection Time: 08/27/22  8:34 AM   Result Value Ref Range    SAMPLES BEING HELD BLDCS     COMMENT        Add-on orders for these samples will be processed based on acceptable specimen integrity and analyte stability, which may vary by analyte.    ECHO ADULT COMPLETE    Collection Time: 08/27/22 11:01 AM   Result Value Ref Range    IVSd 0.7 0.6 - 0.9 cm    LVIDd 4.3 3.9 - 5.3 cm    LVIDs 2.5 cm    LVOT Diameter 1.7 cm    LVPWd 0.7 0.6 - 0.9 cm    LVOT Peak Gradient 7 mmHg    LVOT Mean Gradient 3 mmHg    LVOT SV 56.3 ml    LVOT Peak Velocity 1.3 m/s    LVOT VTI 24.8 cm    RVIDd 4.3 cm    RVSP 34 mmHg    RV Free Wall Peak S' 13 cm/s    LA Diameter 3.7 cm    LA Volume A/L 43 mL    LA Volume 2C 37 22 - 52 mL    LA Volume 4C 42 22 - 52 mL    LA Volume BP 41 22 - 52 mL    Est. RA Pressure 3 mmHg    AV Area by Peak Velocity 1.5 cm2    AV Area by Peak Velocity 1.5 cm2 AV Area by VTI 1.5 cm2    AV Peak Gradient 17 mmHg    AV Peak Gradient 16 mmHg    AV Mean Gradient 8 mmHg    AV Peak Velocity 2.0 m/s    AV Peak Velocity 2.0 m/s    AV Mean Velocity 1.3 m/s    AV VTI 40.2 cm    MV A Velocity 1.08 m/s    MV E Wave Deceleration Time 250.9 ms    MV E Velocity 1.00 m/s    LV E' Lateral Velocity 8 cm/s    LV E' Septal Velocity 9 cm/s    Pulmonary Artery EDP 2 mmHg    OH Max Velocity 0.7 m/s    PV Peak Gradient 7 mmHg    PV Max Velocity 1.3 m/s    TAPSE 2.3 1.7 cm    TR Peak Gradient 31 mmHg    TR Max Velocity 2.78 m/s    Aortic Arch 1.8 cm    Ascending Aorta 2.8 cm    Fractional Shortening 2D 42 28 - 44 %    LVIDd Index 2.30 cm/m2    LVIDs Index 1.34 cm/m2    LV RWT Ratio 0.33     LV Mass 2D 88.5 67 - 162 g    LV Mass 2D Index 47.3 43 - 95 g/m2    MV E/A 0.93     E/E' Ratio (Averaged) 11.81     E/E' Lateral 12.50     E/E' Septal 11.11     LA Volume Index BP 22 16 - 34 ml/m2    LA Volume Index A/L 23 16 - 34 mL/m2    LVOT Stroke Volume Index 30.1 mL/m2    LVOT Area 2.3 cm2    LA Volume Index 2C 20 16 - 34 mL/m2    LA Volume Index 4C 22 16 - 34 mL/m2    LA Size Index 1.98 cm/m2    Ascending Aorta Index 1.50 cm/m2    LVOT:AV VTI Index 0.62     REZA/BSA VTI 0.8 cm2/m2   GLUCOSE, POC    Collection Time: 08/27/22 11:25 AM   Result Value Ref Range    Glucose (POC) 246 (H) 65 - 117 mg/dL    Performed by Omar Feliciano      Review of Systems:  Pertinent items are noted in the History of Present Illness. Physical Exam  LEFT knee exam:   There is a moderate knee effusion with no erythema of the skin and there is no open wounds or blisters. She can extend her knee to near neutral and flex to around 70 degrees with mod pain at end ranges. She is TTP at the joint line. There is no pain over the patellar or quad tendons. EXAM: XR KNEE LT MAX 2 VWS     INDICATION: Left knee pain. COMPARISON: None.      FINDINGS: Two views of the left knee demonstrate no fracture or other acute  osseous or articular abnormality. There is a joint effusion. There is moderate  tricompartmental DJD. IMPRESSION  Joint effusion and DJD. No fracture. Procedures: The lateral suprapatellar area of the LEFT knee was marked, skin cleansed with betadine swab x 3. An 18 gauge needle as introduced into the joint and 30ml of yellow synovial fluid was drawn out. The needle was removed and a bandaid was placed over the arthrocentesis site. The patient tolerated the procedure well. A sterile  was placed over the syringe and the syringe was labeled with L knee synovial fluid on a patient sticker. The fluid was sent to the lab for culture/crystla/cell count analysis. Assessment:   74 y.o FM with moerate knee effusion and OA in the setting of bacteremia and UTI     Plan:   L knee arthrocentesis was performed and fluid was sent to lab for cultures/cell count/crystal analysis.    Keep NPO until cell count complete   If Cell count suggests septic arhtitis patient will need to be washed out in the OR with Dr. Oliver Banks this afternoon    Will f/u after synovial fluid analysis complete    Plan discussed with Dr. Oliver Banks and he agrees with plan

## 2022-08-27 NOTE — PROGRESS NOTES
Ultrasound IV by Cliff Moncada, RN :  Procedure Note    Patient meets criteria for US IV insertion. Ultrasound IV education provided to patient. Opportunities for questions given. Ultrasound used for PIV placement:  20 gauge 6 cm Arrow Endurance Extended Dwell(may stay in place for 29 days)  R lower forearm location. 1 X Attempt(s). Flushed with ease; vigorous blood return. Procedure tolerated well. Primary RN aware of IV placement and added to LDA.       Cliff Moncada, RN

## 2022-08-27 NOTE — PROGRESS NOTES
Bedside and Verbal shift change report given to [place] (oncoming nurse) by iM Murphy (offgoing nurse). Report included the following information SBAR, Kardex, ED Summary, Procedure Summary, Intake/Output, MAR, and Recent Results.

## 2022-08-28 LAB
ANION GAP SERPL CALC-SCNC: 6 MMOL/L (ref 5–15)
ATRIAL RATE: 89 BPM
BACTERIA SPEC CULT: ABNORMAL
BACTERIA SPEC CULT: ABNORMAL
BASOPHILS # BLD: 0.2 K/UL (ref 0–0.1)
BASOPHILS NFR BLD: 1 % (ref 0–1)
BUN SERPL-MCNC: 10 MG/DL (ref 6–20)
BUN/CREAT SERPL: 19 (ref 12–20)
CALCIUM SERPL-MCNC: 8.8 MG/DL (ref 8.5–10.1)
CALCULATED P AXIS, ECG09: 39 DEGREES
CALCULATED R AXIS, ECG10: 27 DEGREES
CALCULATED T AXIS, ECG11: 41 DEGREES
CC UR VC: ABNORMAL
CHLORIDE SERPL-SCNC: 105 MMOL/L (ref 97–108)
CO2 SERPL-SCNC: 29 MMOL/L (ref 21–32)
CREAT SERPL-MCNC: 0.52 MG/DL (ref 0.55–1.02)
DIAGNOSIS, 93000: NORMAL
DIFFERENTIAL METHOD BLD: ABNORMAL
EOSINOPHIL # BLD: 0 K/UL (ref 0–0.4)
EOSINOPHIL NFR BLD: 0 % (ref 0–7)
ERYTHROCYTE [DISTWIDTH] IN BLOOD BY AUTOMATED COUNT: 12.4 % (ref 11.5–14.5)
GLUCOSE BLD STRIP.AUTO-MCNC: 199 MG/DL (ref 65–117)
GLUCOSE BLD STRIP.AUTO-MCNC: 317 MG/DL (ref 65–117)
GLUCOSE BLD STRIP.AUTO-MCNC: 337 MG/DL (ref 65–117)
GLUCOSE BLD STRIP.AUTO-MCNC: 343 MG/DL (ref 65–117)
GLUCOSE BLD STRIP.AUTO-MCNC: 371 MG/DL (ref 65–117)
GLUCOSE BLD STRIP.AUTO-MCNC: 392 MG/DL (ref 65–117)
GLUCOSE BLD STRIP.AUTO-MCNC: 401 MG/DL (ref 65–117)
GLUCOSE SERPL-MCNC: 212 MG/DL (ref 65–100)
HCT VFR BLD AUTO: 30.5 % (ref 35–47)
HGB BLD-MCNC: 10 G/DL (ref 11.5–16)
IMM GRANULOCYTES # BLD AUTO: 0 K/UL
IMM GRANULOCYTES NFR BLD AUTO: 0 %
LYMPHOCYTES # BLD: 1.2 K/UL (ref 0.8–3.5)
LYMPHOCYTES NFR BLD: 8 % (ref 12–49)
MAGNESIUM SERPL-MCNC: 2.1 MG/DL (ref 1.6–2.4)
MCH RBC QN AUTO: 27.5 PG (ref 26–34)
MCHC RBC AUTO-ENTMCNC: 32.8 G/DL (ref 30–36.5)
MCV RBC AUTO: 84 FL (ref 80–99)
MONOCYTES # BLD: 0.9 K/UL (ref 0–1)
MONOCYTES NFR BLD: 6 % (ref 5–13)
MYELOCYTES NFR BLD MANUAL: 1 %
NEUTS BAND NFR BLD MANUAL: 1 % (ref 0–6)
NEUTS SEG # BLD: 12.9 K/UL (ref 1.8–8)
NEUTS SEG NFR BLD: 83 % (ref 32–75)
NRBC # BLD: 0 K/UL (ref 0–0.01)
NRBC BLD-RTO: 0 PER 100 WBC
P-R INTERVAL, ECG05: 166 MS
PLATELET # BLD AUTO: 381 K/UL (ref 150–400)
PMV BLD AUTO: 9.1 FL (ref 8.9–12.9)
POTASSIUM SERPL-SCNC: 3.5 MMOL/L (ref 3.5–5.1)
Q-T INTERVAL, ECG07: 370 MS
QRS DURATION, ECG06: 76 MS
QTC CALCULATION (BEZET), ECG08: 450 MS
RBC # BLD AUTO: 3.63 M/UL (ref 3.8–5.2)
RBC MORPH BLD: ABNORMAL
SERVICE CMNT-IMP: ABNORMAL
SODIUM SERPL-SCNC: 140 MMOL/L (ref 136–145)
VANCOMYCIN SERPL-MCNC: 3.4 UG/ML
VENTRICULAR RATE, ECG03: 89 BPM
WBC # BLD AUTO: 15.4 K/UL (ref 3.6–11)

## 2022-08-28 PROCEDURE — 74011250636 HC RX REV CODE- 250/636: Performed by: ORTHOPAEDIC SURGERY

## 2022-08-28 PROCEDURE — 36415 COLL VENOUS BLD VENIPUNCTURE: CPT

## 2022-08-28 PROCEDURE — 74011636637 HC RX REV CODE- 636/637: Performed by: INTERNAL MEDICINE

## 2022-08-28 PROCEDURE — 80048 BASIC METABOLIC PNL TOTAL CA: CPT

## 2022-08-28 PROCEDURE — 87040 BLOOD CULTURE FOR BACTERIA: CPT

## 2022-08-28 PROCEDURE — 97162 PT EVAL MOD COMPLEX 30 MIN: CPT

## 2022-08-28 PROCEDURE — 83735 ASSAY OF MAGNESIUM: CPT

## 2022-08-28 PROCEDURE — 74011250637 HC RX REV CODE- 250/637: Performed by: ORTHOPAEDIC SURGERY

## 2022-08-28 PROCEDURE — 82962 GLUCOSE BLOOD TEST: CPT

## 2022-08-28 PROCEDURE — 74011250636 HC RX REV CODE- 250/636: Performed by: INTERNAL MEDICINE

## 2022-08-28 PROCEDURE — 74011250637 HC RX REV CODE- 250/637: Performed by: INTERNAL MEDICINE

## 2022-08-28 PROCEDURE — 94761 N-INVAS EAR/PLS OXIMETRY MLT: CPT

## 2022-08-28 PROCEDURE — 65270000029 HC RM PRIVATE

## 2022-08-28 PROCEDURE — 74011000258 HC RX REV CODE- 258: Performed by: INTERNAL MEDICINE

## 2022-08-28 PROCEDURE — 74011000250 HC RX REV CODE- 250: Performed by: ORTHOPAEDIC SURGERY

## 2022-08-28 PROCEDURE — 74011000250 HC RX REV CODE- 250: Performed by: STUDENT IN AN ORGANIZED HEALTH CARE EDUCATION/TRAINING PROGRAM

## 2022-08-28 PROCEDURE — 80202 ASSAY OF VANCOMYCIN: CPT

## 2022-08-28 PROCEDURE — 85025 COMPLETE CBC W/AUTO DIFF WBC: CPT

## 2022-08-28 PROCEDURE — 77010033678 HC OXYGEN DAILY

## 2022-08-28 PROCEDURE — 74011000250 HC RX REV CODE- 250: Performed by: INTERNAL MEDICINE

## 2022-08-28 PROCEDURE — 97116 GAIT TRAINING THERAPY: CPT

## 2022-08-28 PROCEDURE — 99223 1ST HOSP IP/OBS HIGH 75: CPT | Performed by: INTERNAL MEDICINE

## 2022-08-28 RX ORDER — POLYETHYLENE GLYCOL 3350 17 G/17G
17 POWDER, FOR SOLUTION ORAL DAILY
Status: DISCONTINUED | OUTPATIENT
Start: 2022-08-28 | End: 2022-09-01 | Stop reason: HOSPADM

## 2022-08-28 RX ORDER — HYDRALAZINE HYDROCHLORIDE 20 MG/ML
10 INJECTION INTRAMUSCULAR; INTRAVENOUS
Status: DISCONTINUED | OUTPATIENT
Start: 2022-08-28 | End: 2022-08-29

## 2022-08-28 RX ORDER — SODIUM CHLORIDE 0.9 % (FLUSH) 0.9 %
5-40 SYRINGE (ML) INJECTION AS NEEDED
Status: DISCONTINUED | OUTPATIENT
Start: 2022-08-28 | End: 2022-09-01 | Stop reason: HOSPADM

## 2022-08-28 RX ORDER — NALOXONE HYDROCHLORIDE 0.4 MG/ML
0.4 INJECTION, SOLUTION INTRAMUSCULAR; INTRAVENOUS; SUBCUTANEOUS AS NEEDED
Status: DISCONTINUED | OUTPATIENT
Start: 2022-08-28 | End: 2022-09-01 | Stop reason: HOSPADM

## 2022-08-28 RX ORDER — MAG HYDROX/ALUMINUM HYD/SIMETH 200-200-20
30 SUSPENSION, ORAL (FINAL DOSE FORM) ORAL
Status: DISCONTINUED | OUTPATIENT
Start: 2022-08-28 | End: 2022-09-01 | Stop reason: HOSPADM

## 2022-08-28 RX ORDER — FACIAL-BODY WIPES
10 EACH TOPICAL DAILY PRN
Status: DISCONTINUED | OUTPATIENT
Start: 2022-08-30 | End: 2022-09-01 | Stop reason: HOSPADM

## 2022-08-28 RX ORDER — FERROUS SULFATE, DRIED 160(50) MG
1 TABLET, EXTENDED RELEASE ORAL
Status: DISCONTINUED | OUTPATIENT
Start: 2022-08-29 | End: 2022-09-01 | Stop reason: HOSPADM

## 2022-08-28 RX ORDER — MORPHINE SULFATE 2 MG/ML
2 INJECTION, SOLUTION INTRAMUSCULAR; INTRAVENOUS
Status: ACTIVE | OUTPATIENT
Start: 2022-08-28 | End: 2022-08-29

## 2022-08-28 RX ORDER — SODIUM CHLORIDE 9 MG/ML
125 INJECTION, SOLUTION INTRAVENOUS CONTINUOUS
Status: DISCONTINUED | OUTPATIENT
Start: 2022-08-28 | End: 2022-08-28

## 2022-08-28 RX ORDER — ADHESIVE BANDAGE
30 BANDAGE TOPICAL DAILY PRN
Status: DISCONTINUED | OUTPATIENT
Start: 2022-08-28 | End: 2022-08-28

## 2022-08-28 RX ORDER — OXYCODONE HYDROCHLORIDE 5 MG/1
2.5 TABLET ORAL
Status: DISCONTINUED | OUTPATIENT
Start: 2022-08-28 | End: 2022-09-01 | Stop reason: HOSPADM

## 2022-08-28 RX ORDER — SODIUM CHLORIDE 0.9 % (FLUSH) 0.9 %
5-40 SYRINGE (ML) INJECTION EVERY 8 HOURS
Status: DISCONTINUED | OUTPATIENT
Start: 2022-08-28 | End: 2022-09-01 | Stop reason: HOSPADM

## 2022-08-28 RX ORDER — INSULIN LISPRO 100 [IU]/ML
12 INJECTION, SOLUTION INTRAVENOUS; SUBCUTANEOUS ONCE
Status: COMPLETED | OUTPATIENT
Start: 2022-08-28 | End: 2022-08-28

## 2022-08-28 RX ORDER — AMOXICILLIN 250 MG
1 CAPSULE ORAL 2 TIMES DAILY
Status: DISCONTINUED | OUTPATIENT
Start: 2022-08-28 | End: 2022-09-01 | Stop reason: HOSPADM

## 2022-08-28 RX ORDER — GUAIFENESIN 100 MG/5ML
81 LIQUID (ML) ORAL DAILY
Status: ON HOLD | COMMUNITY
End: 2022-09-01 | Stop reason: SDUPTHER

## 2022-08-28 RX ADMIN — VALSARTAN 320 MG: 80 TABLET, FILM COATED ORAL at 08:58

## 2022-08-28 RX ADMIN — VANCOMYCIN HYDROCHLORIDE 1000 MG: 1 INJECTION, POWDER, LYOPHILIZED, FOR SOLUTION INTRAVENOUS at 04:26

## 2022-08-28 RX ADMIN — Medication 7 UNITS: at 12:24

## 2022-08-28 RX ADMIN — IBUPROFEN 600 MG: 600 TABLET, FILM COATED ORAL at 10:26

## 2022-08-28 RX ADMIN — Medication 7 UNITS: at 08:57

## 2022-08-28 RX ADMIN — CEFAZOLIN SODIUM 2 G: 1 POWDER, FOR SOLUTION INTRAMUSCULAR; INTRAVENOUS at 10:27

## 2022-08-28 RX ADMIN — POLYETHYLENE GLYCOL 3350 17 G: 17 POWDER, FOR SOLUTION ORAL at 08:57

## 2022-08-28 RX ADMIN — DOCUSATE SODIUM 50MG AND SENNOSIDES 8.6MG 1 TABLET: 8.6; 5 TABLET, FILM COATED ORAL at 08:58

## 2022-08-28 RX ADMIN — Medication 10 ML: at 07:22

## 2022-08-28 RX ADMIN — INSULIN HUMAN 25 UNITS: 100 INJECTION, SUSPENSION SUBCUTANEOUS at 16:23

## 2022-08-28 RX ADMIN — Medication 10 ML: at 02:00

## 2022-08-28 RX ADMIN — DOCUSATE SODIUM 50MG AND SENNOSIDES 8.6MG 1 TABLET: 8.6; 5 TABLET, FILM COATED ORAL at 17:29

## 2022-08-28 RX ADMIN — CEFAZOLIN SODIUM 2 G: 1 POWDER, FOR SOLUTION INTRAMUSCULAR; INTRAVENOUS at 17:29

## 2022-08-28 RX ADMIN — Medication 4 UNITS: at 22:00

## 2022-08-28 RX ADMIN — CEFEPIME 2 G: 2 INJECTION, POWDER, FOR SOLUTION INTRAVENOUS at 01:32

## 2022-08-28 RX ADMIN — GUAIFENESIN 600 MG: 600 TABLET ORAL at 10:26

## 2022-08-28 RX ADMIN — ATENOLOL 50 MG: 50 TABLET ORAL at 08:58

## 2022-08-28 RX ADMIN — SODIUM CHLORIDE 125 ML/HR: 9 INJECTION, SOLUTION INTRAVENOUS at 15:01

## 2022-08-28 RX ADMIN — INSULIN LISPRO 12 UNITS: 100 INJECTION, SOLUTION INTRAVENOUS; SUBCUTANEOUS at 16:23

## 2022-08-28 RX ADMIN — INSULIN HUMAN 20 UNITS: 100 INJECTION, SUSPENSION SUBCUTANEOUS at 08:57

## 2022-08-28 RX ADMIN — Medication 10 ML: at 22:01

## 2022-08-28 RX ADMIN — ENOXAPARIN SODIUM 40 MG: 100 INJECTION SUBCUTANEOUS at 08:56

## 2022-08-28 RX ADMIN — Medication 10 ML: at 15:01

## 2022-08-28 RX ADMIN — SODIUM CHLORIDE 125 ML/HR: 9 INJECTION, SOLUTION INTRAVENOUS at 01:30

## 2022-08-28 NOTE — PROGRESS NOTES
Jair Crenshaw [facility]  [address] [facility], [address] [place] [address]  [phone]      Medical Progress Note      NAME: Jai Christian   :  1955  MRM:  [id number]    Date/Time: 2022  2:48 PM         Subjective:     Chief Complaint: \"I'm okay\"     Pt seen and examined. S/p L washout on . No complaints. Doing well. Donzell Romario to mobilize    ROS:  (bold if positive, if negative)    Knee pain      Objective:       Vitals:          Last 24hrs VS reviewed since prior progress note.  Most recent are:    Visit Vitals  BP (!) 156/79 (BP 1 Location: Right upper arm, BP Patient Position: Sitting)   Pulse 75   Temp 97.4 °F (36.3 °C)   Resp 16   Ht 5' 5\" (1.651 m)   Wt 79.4 kg (175 lb 0.7 oz)   SpO2 95%   BMI 29.13 kg/m²     SpO2 Readings from Last 6 Encounters:   22 95%   22 98%    O2 Flow Rate (L/min): 2 l/min     Intake/Output Summary (Last 24 hours) at 2022 1353  Last data filed at 2022 0801  Gross per 24 hour   Intake 1761.25 ml   Output --   Net 1761.25 ml          Exam:     Physical Exam:    Gen:  Well-developed, well-nourished, in no acute distress  HEENT:  Pink conjunctivae, PERRL, hearing intact to voice, moist mucous membranes  Neck:  Supple, without masses, thyroid non-tender  Resp:  No accessory muscle use, clear breath sounds without wheezes rales or rhonchi  Card:  No murmurs, normal S1, S2 without thrills, bruits or peripheral edema  Abd:  Soft, non-tender, non-distended, normoactive bowel sounds are present  Musc:  No cyanosis or clubbing  Skin:  No rashes or ulcers, skin turgor is good  Neuro:  Cranial nerves 3-12 are grossly intact,  strength is 5/5 bilaterally and dorsi / plantarflexion is 5/5 bilaterally, follows commands appropriately  Psych:  Good insight, oriented to person, place and time, alert  L knee dressing C/D/I     Medications Reviewed: (see below)    Lab Data Reviewed: (see below)    ______________________________________________________________________    Medications:     Current Facility-Administered Medications   Medication Dose Route Frequency    0.9% sodium chloride infusion  125 mL/hr IntraVENous CONTINUOUS    sodium chloride (NS) flush 5-40 mL  5-40 mL IntraVENous Q8H    sodium chloride (NS) flush 5-40 mL  5-40 mL IntraVENous PRN    naloxone (NARCAN) injection 0.4 mg  0.4 mg IntraVENous PRN    [START ON 8/29/2022] calcium-vitamin D (OS-JEANNIE +D3) 500 mg-200 unit per tablet 1 Tablet  1 Tablet Oral TID WITH MEALS    senna-docusate (PERICOLACE) 8.6-50 mg per tablet 1 Tablet  1 Tablet Oral BID    polyethylene glycol (MIRALAX) packet 17 g  17 g Oral DAILY    [START ON 8/30/2022] bisacodyL (DULCOLAX) suppository 10 mg  10 mg Rectal DAILY PRN    oxyCODONE IR (ROXICODONE) tablet 2.5 mg  2.5 mg Oral Q4H PRN    morphine injection 2 mg  2 mg IntraVENous Q2H PRN    ceFAZolin (ANCEF) 2 g in sterile water (preservative free) 20 mL IV syringe  2 g IntraVENous Q8H    insulin NPH (NOVOLIN N, HUMULIN N) injection 20 Units  20 Units SubCUTAneous ACB&D    valsartan (DIOVAN) tablet 320 mg  320 mg Oral DAILY    atenoloL (TENORMIN) tablet 50 mg  50 mg Oral DAILY    sodium chloride (NS) flush 5-40 mL  5-40 mL IntraVENous Q8H    sodium chloride (NS) flush 5-40 mL  5-40 mL IntraVENous PRN    acetaminophen (TYLENOL) tablet 650 mg  650 mg Oral Q6H PRN    Or    acetaminophen (TYLENOL) suppository 650 mg  650 mg Rectal Q6H PRN    polyethylene glycol (MIRALAX) packet 17 g  17 g Oral DAILY PRN    ondansetron (ZOFRAN ODT) tablet 4 mg  4 mg Oral Q8H PRN    Or    ondansetron (ZOFRAN) injection 4 mg  4 mg IntraVENous Q6H PRN    guaiFENesin (ROBITUSSIN) 100 mg/5 mL oral liquid 100 mg  100 mg Oral Q4H PRN    insulin lispro (HUMALOG) injection   SubCUTAneous AC&HS    glucose chewable tablet 16 g  4 Tablet Oral PRN    glucagon (GLUCAGEN) injection 1 mg  1 mg IntraMUSCular PRN    dextrose 10% infusion 0-250 mL  0-250 mL IntraVENous PRN    ibuprofen (MOTRIN) tablet 600 mg  600 mg Oral Q6H PRN    guaiFENesin ER (MUCINEX) tablet 600 mg  600 mg Oral Q12H    enoxaparin (LOVENOX) injection 40 mg  40 mg SubCUTAneous Q24H    sodium chloride (NS) flush 5-10 mL  5-10 mL IntraVENous PRN            Lab Review:     Recent Labs     08/28/22  0301 08/27/22  0834 08/26/22  0300   WBC 15.4* 16.8* 11.4*   HGB 10.0* 10.8* 10.1*   HCT 30.5* 32.7* 29.8*    400 322     Recent Labs     08/28/22  0301 08/27/22  0834 08/26/22  0300 08/25/22  1954    141 141 133*   K 3.5 3.4* 3.2* 3.3*    106 108 96*   CO2 29 29 27 30   * 217* 200* 292*   BUN 10 8 12 17   CREA 0.52* 0.61 0.64 1.04*   CA 8.8 8.9 8.3* 9.5   MG 2.1  --   --   --    ALB  --   --  2.3* 3.1*   ALT  --   --  14 15     No components found for: [place]         Assessment / Plan:   Sepsis - MSSA bacteremia, UTI and L native knee infection   -repeat blood cultures pending    -on cefe/vanco => de-escalate; ID on board; cefazolin   -CXR without acute process  -TTE without obvious vegetations; defer need for MELI to ID   -CT ab/pelvis without acute process  -ortho consulted for arthrocentesis considering knee effusion; s/p I+D of L knee on 8/27 => fu cultures     DM   -ISS   -BG checks AC TID and qHS  -on 35u 70/30 in the AM; A1c > 10   -increase NPH to 20mg BID for now     HTN   -on atenolol; may need increase if BP's remain elevated   -resume ARB; increase to 320mg considering BP     Hypokalemia   -replete PRN     L native knee infection/effusion - s/p I+D on 8/27   -as above    Total time spent with patient: 35 [address] [facility] discussed with: Patient and Family    Discussed:  Care Plan    Prophylaxis:  Lovenox    Disposition:  Home w/Family           ___________________________________________________    Attending Physician: Justina Meckel, MD

## 2022-08-28 NOTE — PROGRESS NOTES
PT Note-    Orders canceled after patient already seen per PT. Will need resume orders for one more session- gait and stairs to clear for d/c to home when medically cleared.      Hannah Lisa, PT, DPT

## 2022-08-28 NOTE — PROGRESS NOTES
Ortho Note    Subjective:    Allen Mckeon is a 74 y.o. female who is POD 1 I and D LEFT knee    Major Events:.     VICKI overnight, pain controlled     Objective:    Vital signs in last 24 hours:    Temp:  [97.3 °F (36.3 °C)-102.9 °F (39.4 °C)]   Pulse (Heart Rate):  [65-96]   BP: (122-189)/()   Resp Rate:  [10-25]   O2 Sat (%):  [95 %-100 %]   Weight:  [79.4 kg (175 lb)-79.4 kg (175 lb 0.7 oz)]     Temp (24hrs), Av.3 °F (36.8 °C), Min:97.3 °F (36.3 °C), Max:99 °F (37.2 °C)      Labs:    Lab Results   Component Value Date/Time    WBC 15.4 (H) 2022 03:01 AM    HGB 10.0 (L) 2022 03:01 AM    HCT 30.5 (L) 2022 03:01 AM    PLATELET 070  03:01 AM       Lab Results   Component Value Date/Time    Sodium 140 2022 03:01 AM    Potassium 3.5 2022 03:01 AM    Chloride 105 2022 03:01 AM    CO2 29 2022 03:01 AM    BUN 10 2022 03:01 AM       Physical Exam:    General: alert, cooperative, in NAD  Nonlabored resp    LEFT Lower extremity    Dressing: c/d/i      Motor: + ankle df/pf    Sensory: SILT    Vascular: Brisk capillary refill in toes    Assessment/Plan:    · Pain management: as written    -Sx cultures pending    · DVT Prophylaxis: Lovenox 40 mg sq daily     · PT/OT: WBAT    -Keep drain in place until mon/tu     · Dispo: pending ID reccs on abx guidance    [facility]: [facility] 1 weeks [phone]  Lazarus Minion, PA-C

## 2022-08-28 NOTE — PERIOP NOTES
TRANSFER - OUT REPORT:    Verbal report given to [facility] CHANDRA RN (name) on Chris Forbes  being transferred to [address] [place] [address] (unit) for routine post - op       Report consisted of patients Situation, Background, Assessment and   Recommendations(SBAR). Information from the following report(s) SBAR, Kardex, Intake/Output, MAR, Recent Results, and Cardiac Rhythm NSR  was reviewed with the receiving nurse. Lines:   Peripheral IV 08/25/22 Right Forearm (Active)   Site Assessment Clean, dry, & intact 08/27/22 2205   Phlebitis Assessment 0 08/27/22 2205   Infiltration Assessment 0 08/27/22 2205   Dressing Status Clean, dry, & intact 08/27/22 2205   Dressing Type Transparent 08/27/22 2205   Hub Color/Line Status Pink; Infusing 08/27/22 2205   Action Taken Open ports on tubing capped 08/27/22 2205   Alcohol Cap Used Yes 08/27/22 2205       Extended Dwell Peripheral IV (Active)   Criteria for Appropriate Use Limited/no vessel suitable for conventional peripheral access 08/27/22 1629   Site Assessment Clean, dry, & intact 08/27/22 1629   Phlebitis Assessment 0 08/27/22 1629   Infiltration Assessment 0 08/27/22 1629   Line Status Brisk blood return;Capped;Normal saline locked; Flushed 08/27/22 [address] pulled back 08/27/22 1156   Alcohol Cap Used Yes 08/27/22 1156   Date of Last Dressing Change 08/27/22 08/27/22 1156   Dressing Type Tape;Transparent 08/27/22 1156   Dressing Status Clean, dry, & intact 08/27/22 1156   Dressing Intervention New dressing 08/27/22 1156        Opportunity for questions and clarification was provided.       Patient transported with:   Registered Nurse

## 2022-08-28 NOTE — OP NOTES
[address]  OPERATIVE REPORT    Name:  Ramin Alejandre  MR#:  [id number]  :  1955  ACCOUNT #:  [de-identified]  DATE OF SERVICE:  2022    PREOPERATIVE DIAGNOSIS:  Left knee septic arthritis. POSTOPERATIVE DIAGNOSIS:  Left knee septic arthritis. PROCEDURE PERFORMED:  Incision and drainage, left knee joint. SURGEON:  Rhona Holt MD    ASSISTANT:  David.    ANESTHESIA:  General and local.    COMPLICATIONS:  None. SPECIMENS REMOVED:  Deep culture, left knee joint. IMPLANTS:  None. ESTIMATED BLOOD LOSS:   Less than 5 mL. DRAINS:  Medium Hemovac drain. The drain was not sewn in place. FINDINGS:  Purulent joint fluid once the capsulotomy was made. TOURNIQUET TIME:  Thigh tourniquet at 300 mmHg for 25 minutes. INDICATIONS FOR PROCEDURE:  This is a 80-year-old female, who reports she has had a swelling in her left knee for several weeks. She presented to the [address] [place] [address] Emergency Department yesterday and was admitted for sepsis. Blood cultures were positive. She had increasing pain in her left knee, pain with range of motion and pain with palpation and a positive effusion. Her knee was aspirated and sent to the lab for analysis and the white blood cell count in the fluid was 59,000 white blood cells. X-rays were negative for fracture. There was moderate osteoarthritis. There were no implants in place. The joint aspirate was diagnostic for septic arthritis. I offered both conservative and surgical management options to the patient. I discussed the risks of surgery which include, but not limited to complications of anesthesia including death, pain, bleeding, infection, damage to surrounding structures, wound healing problems, DVT, PE, persistence of wound infection and need for further surgery as well as stiffness and pain in her knee postoperatively.   The patient verbalized understanding and elected to proceed with surgical intervention. DESCRIPTION OF PROCEDURE:  The correct patient, extremity, and operation were all identified in the preop holding area and she was taken back to the operating room and placed under general anesthesia. She was placed in the supine position and all bony prominences were padded well and she was secured to the table with a safety strap. A left nonsterile well-padded upper thigh tourniquet was placed and the left lower extremity was prepped and draped in the usual sterile fashion. A preop time-out was called. Again, the correct patient, extremity and operation were all identified, all parties were in agreement and we proceeded with the operation. The patient continued to receive her IV vancomycin. The left lower extremity was then exsanguinated with an Esmarch bandage and the tourniquet was raised. A medial incision directly over the medial aspect of the knee joint was made longitudinally with a knife. The incision was approximately 3 cm long. Sharp dissection was carried down to the level of skin and blunt dissection carried down to the level of the capsule. A medial parapatellar arthrotomy was made approximately 2.5 cm in length. Once the arthrotomy was made, there was immediate return of purulent fluid. This fluid was cultured. The knee joint was then copiously irrigated with normal saline using a pulse lavage. A culture of the synovium was also taken. This was placed in the culture tube. A medium Hemovac drain was then placed. The capsule was closed with a 0 PDS suture and knee flexion and extension showed no openings in the arthrotomy and it was secure. The wound was then further closed in layers and a sterile dressing was applied. The tourniquet was dropped and the patient was awakened from anesthesia and taken to the recovery room in hemodynamic stable condition. All lap and sharp counts were correct at the end of the case.     POSTOPERATIVE CARE:  The patient will start Lovenox 40 mg subcu daily for DVT prophylaxis on the morning of postop day #1. She will be weightbearing as tolerated to the left lower extremity and plan will be to keep her drain in place until at least Monday or Tuesday. She will need an Infectious Disease consult for antibiotic guidance going forward. I will defer to infectious disease for the antibiotic selection, route and duration. The patient will follow up with MAE Hunt, in the [facility] office in approximately 1 week after discharge.         Valente Rocha MD      [id number]  D:  08/27/2022 21:58  T:  08/28/2022 2:04  JOB #:  [id number]  CC:

## 2022-08-28 NOTE — BRIEF OP NOTE
Brief Postoperative Note    Patient: Gutierrez Gallardo  YOB: 1955  MRN: [id number]    Date of Procedure: 8/27/2022     Pre-Op Diagnosis: LEFT knee septic arthritis    Post-Op Diagnosis: Same as preoperative diagnosis. Procedure(s):  INCISION AND DRAINAGE LEFT KNEE    Surgeon(s): Jimmie Zimmerman MD    Surgical Assistant: David    Anesthesia: General and local    Estimated Blood Loss (mL): less than 5cc    Complications: None    Specimens: * No specimens in log *     Deep culture left knee joint    Implants: * No implants in log *    none    Drains:   Hemovac Anterior; Left Knee (Active)     Medium hemovac drain    Findings: purulent joint fluid    TT: Thigh at 300mmHg x 25 min    Electronically Signed by Ba Holt MD on 8/27/2022 at 9:01 PM

## 2022-08-28 NOTE — ANESTHESIA POSTPROCEDURE EVALUATION
Procedure(s):  INCISION AND DRAINAGE LEFT KNEE. general    Anesthesia Post Evaluation      Multimodal analgesia: multimodal analgesia not used between 6 hours prior to anesthesia start to PACU discharge  Patient location during evaluation: PACU  Patient participation: complete - patient participated  Level of consciousness: awake  Pain score: 3  Pain management: adequate  Airway patency: patent  Anesthetic complications: no  Cardiovascular status: acceptable, blood pressure returned to baseline and hemodynamically stable  Respiratory status: acceptable  Hydration status: acceptable  Post anesthesia nausea and vomiting:  controlled  Final Post Anesthesia Temperature Assessment:  Normothermia (36.0-37.5 degrees C)      INITIAL Post-op Vital signs:   Vitals Value Taken Time   /84 08/28/22 0016   Temp 36.9 °C (98.4 °F) 08/28/22 0016   Pulse 81 08/28/22 0122   Resp 16 08/28/22 0016   SpO2 97 % 08/27/22 2356   Vitals shown include unvalidated device data.

## 2022-08-28 NOTE — PERIOP NOTES
2248  Anesthesia informed of elevated 'S Labetalol 10 mg given for elevated per orders. 2316  Repeat dose Labetalol 10 mg given per orders. Cont to monitor.

## 2022-08-28 NOTE — CONSULTS
Infectious Disease Consult Note    Reason for Consult: Left knee septic arthritis  Date of Consultation: August 28, 2022  Date of Admission: 8/25/2022  Referring Physician: Dr Regine Garcia / Dr Peewee Treviño       HPI:  Ms Renetta Roberts is a 60-year-old lady with a history of COVID diabetes hypertension who is originally from [facility]. She came to visit her daughter was having a child. Patient tells me that she has had symptoms of pain around her waist knee ankle since COVID last year. The left knee had worsened recently. She denies any intra-articular joint injections with steroids. She denies any previous trauma to her joint. She denies any antibiotics prior to admission. She presented to the ER on 8/25/2022 with worsening knee pain. That week she had gone walking with her friends and noticed that the pain was getting worse substantially. She took an ibuprofen for it. She says she has had some chills and possibly fevers but could have been suppressed in the setting of antipyretics. There was also concern for hematuria on presentation. Subsequent work-up showed a joint effusion on her left knee . She was also found to have MSSA in her blood culture on 8/25/2022. and 3 out of 4 bottles. Left knee aspirated and found to have 59,040 and 55 white cells with 94% segmented cells and no crystals. The fluid looked yellow and turbid in color. She was taken to the operating room for I&D of the left knee on 8/27/2022 with purulent fluid noted in the joint. She was initially on vancomycin and cefepime. In regards to allergies patient tells me that she was told she is penicillin allergic. 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     Past Medical History:  Past Medical History:   Diagnosis Date    Diabetes ([address]     Hypertension          Surgical History:  Past Surgical History:   Procedure Laterality Date    HX GYN Family History:   History reviewed. No pertinent family history. Social History:     Living Situation: Originally from [facility], came to visit her daughter, pharmacist by occupation per patient  Tobacco: Denied  Alcohol: Denied  Illicit Drugs: Denied  Travel History from [facility]      Allergies: Allergies   Allergen Reactions    Penicillins Other (comments)     Was told by her mom that she was allergic         Review of Systems:    10 point review of systems per HPI. All others negative. Medications:  No current facility-administered medications on file prior to encounter. Current Outpatient Medications on File Prior to Encounter   Medication Sig Dispense Refill    insulin NPH/insulin regular (NOVOLIN 70/30, HUMULIN 70/30) 100 unit/mL (70-30) injection by SubCUTAneous route. atenoloL (TENORMIN) 50 mg tablet Take  by mouth daily. candesartan (ATACAND) 16 mg tablet Take  by mouth daily.              Physical Exam:    Vitals: Patient Vitals for the past 24 hrs:   Temp Pulse Resp BP SpO2   08/28/22 0801 97.7 °F (36.5 °C) 90 16 (!) 150/80 98 %   08/28/22 0734 -- 81 -- -- --   08/28/22 0341 97.3 °F (36.3 °C) 75 16 (!) 174/95 97 %   08/28/22 0333 97.6 °F (36.4 °C) 74 16 (!) 150/90 100 %   08/28/22 0218 97.7 °F (36.5 °C) 70 16 139/82 100 %   08/28/22 0122 -- 81 -- -- --   08/28/22 0117 97.7 °F (36.5 °C) 72 16 (!) 142/81 100 %   08/28/22 0016 98.4 °F (36.9 °C) 76 16 (!) 166/84 --   08/27/22 2355 -- 79 14 (!) 159/81 98 %   08/27/22 2345 -- 79 10 (!) 154/78 98 %   08/27/22 2340 -- 80 11 (!) 159/76 96 %   08/27/22 2335 -- 79 11 (!) 154/74 97 %   08/27/22 2330 -- 79 12 (!) 160/75 96 %   08/27/22 2325 -- 79 12 (!) 161/76 96 %   08/27/22 2320 -- 79 12 (!) 148/74 98 %   08/27/22 2315 -- 81 14 (!) 175/77 97 %   08/27/22 2310 -- 80 12 (!) 160/93 97 %   08/27/22 2305 -- 80 14 (!) 177/80 98 %   08/27/22 2300 99 °F (37.2 °C) 80 14 (!) 173/81 97 %   08/27/22 2255 -- 81 20 (!) 170/80 98 %   08/27/22 2250 -- 80 17 (!) 189/86 99 %   08/27/22 2245 -- 80 16 (!) 179/85 97 %   08/27/22 2240 -- 81 23 (!) 176/84 100 %   08/27/22 2235 -- 80 17 (!) 188/83 98 %   08/27/22 2230 -- 81 20 (!) 182/85 97 %   08/27/22 2225 -- 81 -- (!) 181/81 97 %   08/27/22 2220 -- 82 14 (!) 171/79 97 %   08/27/22 2215 -- 81 13 (!) 171/80 100 %   08/27/22 2210 98.9 °F (37.2 °C) 82 16 (!) 167/85 97 %   08/27/22 2205 98.9 °F (37.2 °C) 76 10 129/68 95 %   08/27/22 1710 99 °F (37.2 °C) 77 16 (!) 156/76 100 %   08/27/22 1126 98.7 °F (37.1 °C) 79 16 (!) 160/78 100 %     GEN: NAD  HEENT: Normocephalic, atraumatic, no scleral icterus, no thrush  Lungs:   Abdomen:  Extremities: no edema noted of the right ankle or knee  Neuro: Alert, oriented to self, place and situation, moves all extremities to commands, verbal   Skin: no rash on face or extremities  Psych: good affect, good eye contact, non tearful   MSK left knee in dressing       Labs:   Recent Results (from the past 24 hour(s))   GLUCOSE, POC    Collection Time: 08/27/22 11:25 AM   Result Value Ref Range    Glucose (POC) 246 (H) 65 - 117 mg/dL    Performed by Cale Obregon    CRYSTALS, SYNOVIAL FLUID    Collection Time: 08/27/22  2:30 PM   Result Value Ref Range    FLUID TYPE(7) SYNOVIAL FLUID      Crystals, body fluid NO CRYSTALS SEEN WITH POLARIZED LIGHT     CULTURE, BODY FLUID W GRAM STAIN    Collection Time: 08/27/22  2:30 PM    Specimen: Synovial Fluid;  Body Fluid   Result Value Ref Range    Special Requests: NO SPECIAL REQUESTS      GRAM STAIN OCCASIONAL WBCS SEEN      GRAM STAIN NO ORGANISMS SEEN      Culture result: PENDING    CELL COUNT, SYNOVIAL    Collection Time: 08/27/22  2:30 PM   Result Value Ref Range    SYNOVIAL FLD SITE LEFT KNEE SYN     SYN FLUID COLOR YELLOW      SYN FLUID APPEARANCE TURBID      SYNOVIAL FLD RBC CT >100 (H) 0 /cu mm    SYNOVIAL FLD WBC CT 59,445 (H) 0 - 150 /cu mm    SYNOVIAL FLD SEGS 94 (H) 0 - 20 %    SYNOVIAL FLD LYMPHS 4 0 - 15 %    SYNOVIAL FLD MONOS 2 0 - 65 % GLUCOSE, POC    Collection Time: 08/27/22  5:05 PM   Result Value Ref Range    Glucose (POC) 163 (H) 65 - 117 mg/dL    Performed by Kaushal Jackson, POC    Collection Time: 08/27/22 10:12 PM   Result Value Ref Range    Glucose (POC) 77 65 - 117 mg/dL    Performed by Carlos Fraga, POC    Collection Time: 08/27/22 11:11 PM   Result Value Ref Range    Glucose (POC) 162 (H) 65 - 117 mg/dL    Performed by Daniella Lagunas    CBC WITH AUTOMATED DIFF    Collection Time: 08/28/22  3:01 AM   Result Value Ref Range    WBC 15.4 (H) 3.6 - 11.0 K/uL    RBC 3.63 (L) 3.80 - 5.20 M/uL    HGB 10.0 (L) 11.5 - 16.0 g/dL    HCT 30.5 (L) 35.0 - 47.0 %    MCV 84.0 80.0 - 99.0 FL    MCH 27.5 26.0 - 34.0 PG    MCHC 32.8 30.0 - 36.5 g/dL    RDW 12.4 11.5 - 14.5 %    PLATELET 808 104 - 026 K/uL    MPV 9.1 8.9 - 12.9 FL    NRBC 0.0 0  WBC    ABSOLUTE NRBC 0.00 0.00 - 0.01 K/uL    NEUTROPHILS 83 (H) 32 - 75 %    BAND NEUTROPHILS 1 0 - 6 %    LYMPHOCYTES 8 (L) 12 - 49 %    MONOCYTES 6 5 - 13 %    EOSINOPHILS 0 0 - 7 %    BASOPHILS 1 0 - 1 %    MYELOCYTES 1 (H) 0 %    IMMATURE GRANULOCYTES 0 %    ABS. NEUTROPHILS 12.9 (H) 1.8 - 8.0 K/UL    ABS. LYMPHOCYTES 1.2 0.8 - 3.5 K/UL    ABS. MONOCYTES 0.9 0.0 - 1.0 K/UL    ABS. EOSINOPHILS 0.0 0.0 - 0.4 K/UL    ABS. BASOPHILS 0.2 (H) 0.0 - 0.1 K/UL    ABS. IMM.  GRANS. 0.0 K/UL    DF MANUAL      RBC COMMENTS NORMOCYTIC, NORMOCHROMIC     METABOLIC PANEL, BASIC    Collection Time: 08/28/22  3:01 AM   Result Value Ref Range    Sodium 140 136 - 145 mmol/L    Potassium 3.5 3.5 - 5.1 mmol/L    Chloride 105 97 - 108 mmol/L    CO2 29 21 - 32 mmol/L    Anion gap 6 5 - 15 mmol/L    Glucose 212 (H) 65 - 100 mg/dL    BUN 10 6 - 20 MG/DL    Creatinine 0.52 (L) 0.55 - 1.02 MG/DL    BUN/Creatinine ratio 19 12 - 20      GFR est AA >60 >60 ml/min/1.73m2    GFR est non-AA >60 >60 ml/min/1.73m2    Calcium 8.8 8.5 - 10.1 MG/DL   MAGNESIUM    Collection Time: 08/28/22  3:01 AM   Result Value Ref Range    Magnesium 2.1 1.6 - 2.4 mg/dL   VANCOMYCIN, RANDOM    Collection Time: 08/28/22  3:01 AM   Result Value Ref Range    Vancomycin, random 3.4 UG/ML   GLUCOSE, POC    Collection Time: 08/28/22  4:09 AM   Result Value Ref Range    Glucose (POC) 199 (H) 65 - 117 mg/dL    Performed by Michele Muñoz    GLUCOSE, POC    Collection Time: 08/28/22  8:38 AM   Result Value Ref Range    Glucose (POC) 317 (H) 65 - 117 mg/dL    Performed by Kira Sanches        Microbiology Data:       Blood:     CULTURE, BLOOD, PAIRED [[id number]] (Order [id number])  Microbiology  Date: 8/25/2022 Department: [facility] [address] Released By/Authorizing: Alexandrea Arias MD (auto-released)      Contains abnormal data CULTURE, BLOOD, PAIRED  Order: [id number]  Collected 8/25/2022 19:54    Status: Preliminary result    Specimen Information: Blood        0 Result Notes  Component Ref Range & Units 8/25/22 1954    Special Requests:   NO SPECIAL REQUESTS P    Culture result:   STAPHYLOCOCCUS AUREUS GROWING IN 3 OF 4 BOTTLES DRAWN SITE=RAC AND LAC Abnormal  P    Culture result:   REMAINING BOTTLE(S) HAS/HAVE NO GROWTH SO FAR P    Culture result:  (NOTE) CALLED POSITIVE BLOOD CULTURE OF GPC IN CLUSTERS GROWING IN 3 OUT OF 4 BOTTLES TO [facility] RN AT 1936 ON 8/26/22. AT [address]        Susceptibility     Staphylococcus aureus     ALEXANDRE (Preliminary)     Ciprofloxacin ($) Susceptible     Clindamycin ($) Resistant     Daptomycin ($$$$$) Susceptible     Doxycycline ($$) Susceptible     Erythromycin ($$$$) Resistant     Gentamicin ($) Susceptible     Levofloxacin ($) Susceptible     Linezolid ($$$$$) Susceptible     Moxifloxacin ($$$$) Susceptible     Oxacillin Susceptible     Rifampin ($$$$) Susceptible 1     Tetracycline Susceptible     Trimeth/Sulfa Susceptible     Vancomycin ($) Susceptible              1 Rifampin is not to be used for mono-therapy.             Specimen Collected: 08/25/22 19:54 Last Resulted: 08/28/22 06:47       Order Details     Lab and Collection Details     Routing     Result History     View Encounter Conversation        P=Value has a preliminary status        Result Care Coordination      Patient Communication     Not Released  Not seen Back to Top           Susceptibility    Staphylococcus aureus    Antibiotic Interpretation Value  Method Comment   Daptomycin ($$$$$) Susceptible 0.25 ug/mL ALEXANDRE    Erythromycin ($$$$) Resistant >=8 ug/mL ALEXANDRE    Gentamicin ($) Susceptible <=0.5 ug/mL ALEXANDRE    Linezolid ($$$$$) Susceptible 2 ug/mL ALEXANDRE    Oxacillin Susceptible <=0.25 ug/mL ALEXANDRE    Rifampin ($$$$) Susceptible <=0.5 ug/mL ALEXANDRE Rifampin is not to be used for mono-therapy. Tetracycline Susceptible <=1 ug/mL ALEXANDRE    Trimeth/Sulfa Susceptible <=10 ug/mL ALEXANDRE    Vancomycin ($) Susceptible <=0.5 ug/mL ALEXANDRE    Ciprofloxacin ($) Susceptible <=0.5 ug/mL ALEXANDRE    Levofloxacin ($) Susceptible 0.25 ug/mL ALEXANDRE    Moxifloxacin ($$$$) Susceptible <=0.25 ug/mL ALEXANDRE    Doxycycline ($$) Susceptible <=0.5 ug/mL ALEXANDRE        Contains abnormal data CULTURE, URINE  Order: [id number]  Collected 8/25/2022 19:28    Status: Preliminary result    Specimen Information: Clean catch; Urine        0 Result Notes  Component Ref Range & Units 8/25/22 1928    Special Requests:   NO SPECIAL REQUESTS P    [place] Count   >100,000   COLONIES/mL  P      Culture result:   PROBABLE STAPHYLOCOCCUS AUREUS Abnormal  P        CULTURE, BODY FLUID Sydell Lizbeth STAIN [[id number]] (Order [id number])  Microbiology  Date: 8/27/2022 Department: [facility] Released By/Authorizing: Tish Wiseman PA-C (auto-released)     CULTURE, BODY FLUID Sujatha Ply  Order: [id number]  Collected 8/27/2022 14:30    Status: Preliminary result    Specimen Information: Synovial Fluid;  Body Fluid        0 Result Notes  Component Ref Range & Units 8/27/22 1430  Resulting Agency   Special Requests:   NO SPECIAL REQUESTS P  [facility]   GRAM STAIN   OCCASIONAL WBCS SEEN P [address]   Culture result:  PENDING P            Imaging:   EXAM: XR KNEE LT MAX 2 VWS     INDICATION: Left knee pain. COMPARISON: None. FINDINGS: Two views of the left knee demonstrate no fracture or other acute  osseous or articular abnormality. There is a joint effusion. There is moderate  tricompartmental DJD. IMPRESSION  Joint effusion and DJD. No fracture. EXAM: XR CHEST PORT     INDICATION: eval for effusion     COMPARISON: 8/24/2022     FINDINGS: A portable AP radiograph of the chest was obtained at 1302 hours. .  The lungs are clear. The cardiac and mediastinal contours and pulmonary  vascularity are normal.  The bones and soft tissues are grossly within normal  limits. IMPRESSION  No acute cardiopulmonary process      Study Details    Image quality: good. The view(s) performed were parasternal, apical, subcostal and suprasternal. The underlying ECG rhythm was sinus rhythm. Procedure performed with the patient in a supine position, color flow Doppler was performed and pulse wave and/or continuous wave Doppler was performed. No contrast was given. Interpretation Summary         Left Ventricle: Normal left ventricular systolic function with a visually estimated EF of 60 - 65%. Left ventricle size is normal. Normal wall thickness. Normal wall motion. Aortic Valve: Tricuspid valve. Mitral Valve: Valve structure is normal. Mild annular calcification of the mitral valve. Comparison Study Information    Prior Study    There is no prior study available for comparison. Echo Findings    Left Ventricle Normal left ventricular systolic function with a visually estimated EF of 60 - 65%. Left ventricle size is normal. Normal wall thickness. Normal wall motion. Left Atrium Left atrium size is normal.   Right Ventricle Right ventricle size is normal. Normal systolic function.    Right Atrium Right atrium size is normal.   Aortic Valve Tricuspid valve. No regurgitation. No stenosis. Mitral Valve Valve structure is normal. Mild annular calcification of the mitral valve. Trace regurgitation. No stenosis noted. Tricuspid Valve Valve structure is normal. No regurgitation. No stenosis noted. Pulmonic Valve The pulmonic valve was not well visualized. Aorta Normal sized ascending aorta. Pericardium No pericardial effusion. Procedures:  I and D L knee 8/27/22     Assessment / Plan:     Ms Christen Conner is a 68-year-old lady with a history of COVID diabetes hypertension with sepsis, MSSA bacteremia and left native septic joint    Sepsis   MSSA bacteremia 8/25/2022  Left septic native knee status post I&D  Diabetes  Hypertension  COVID history    Discussed regarding allergy to penicillin in chart . Patient reports tolerating Augmentin well. 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 Probiotics, daily yogurt encouraged. Further screening for HIV hep C per primary team or ID at [facility] if patient consents  Staph in the urine is spillage from bacteremia    Thank for the opportunity to participate in the care of this patient. Please contact with questions or concerns.        Lauren Mcgrath DO  11:13 AM

## 2022-08-28 NOTE — PROGRESS NOTES
Problem: Mobility Impaired (Adult and Pediatric)  Goal: *Acute Goals and Plan of Care (Insert Text)  Description: FUNCTIONAL STATUS PRIOR TO ADMISSION: Patient was independent and active without use of DME. She is from [facility]. She comes to [address] [facility] to see daughter and family every 2-3 months and stays for ~ month or longer. HOME SUPPORT PRIOR TO ADMISSION: The patient lived with daughter and her family but did not require assist. 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 Physical Therapy Goals  Initiated 8/28/2022    1. Patient will ambulate with modified independence for 250 feet with the least restrictive device within 7 day(s). 2.  Patient will ascend/descend 4 stairs with 1 handrail(s) with modified independence within 7 day(s). Note:   PHYSICAL THERAPY EVALUATION  Patient: Black Chavez (15 y.o. female)  Date: 8/28/2022  Primary Diagnosis: Sepsis ([facility] [address] [A41.9]  Procedure(s) (LRB):  INCISION AND DRAINAGE LEFT KNEE (Left) 1 Day Post-Op   Precautions:   Fall, WBAT (LLE)    ASSESSMENT  Based on the objective data described below, the patient presents with left knee pain following no specific trauma. She was adm 3 days ago for severe knee pain, found UTI. She s/p I&D of knee last evening. Staph infection. Patient on IV antibiotics. Patient able to WBAT on LLE and required RW for unloading of knee due to pain. Balance good. Will request RW for discharge. Patient likely to only need 1-2 more sessions with PT to meet amb and stair climbing goals. Patient is very pleasant, social and enjoys talking. VSS /71 100% 74 bpm Post 139/78 97% 92bpm    Current Level of Function Impacting Discharge (mobility/balance): CG A for amb and RW to unload LLE due to pain    Functional Outcome Measure:   The patient scored 17/28 on the Tinetti Test outcome measure which is indicative of high risk for falls but patient presentation is low risk and anticipate improved, near normal score when less pain with [facility] LLE and without RW. Other factors to consider for discharge:      Patient will benefit from skilled therapy intervention to address the above noted impairments. PLAN :  Recommendations and Planned Interventions: bed mobility training, transfer training, gait training, patient and family training/education, and therapeutic activities      Frequency/Duration: Patient will be followed by physical therapy:  5 times a week to address goals. Recommendation for discharge: (in order for the patient to meet his/her long term goals)  None but per orthopedics    This discharge recommendation:  Has not yet been discussed the attending provider and/or case management    IF patient discharges home will need the following DME: rolling walker         SUBJECTIVE:   Patient stated I am very independent.     OBJECTIVE DATA SUMMARY:   HISTORY:    Past Medical History:   Diagnosis Date    Diabetes ([facility] [address]     Hypertension      Past Surgical History:   Procedure Laterality Date    HX GYN         Personal factors and/or comorbidities impacting plan of care: active lifestyle, no hx of falls, overall healthy with PMH DM and HTN    Home Situation  Home Environment: Private residence  # Steps to Enter: 2  Rails to Enter: Yes  One/Two Story Residence: Two story, live on 1st floor  Living Alone: No  Support Systems: Child(cony)  Patient Expects to be Discharged to[de-identified] Home  Current DME Used/Available at Home: None    EXAMINATION/PRESENTATION/DECISION MAKING:   Critical Behavior:  Neurologic State: Alert  Orientation Level: Oriented X4  Cognition: Appropriate decision making, Appropriate for age attention/concentration, Appropriate safety awareness, Impulsive  Safety/Judgement: Awareness of environment, Fall prevention, Good awareness of safety precautions  Hearing:   Auditory  Auditory Impairment: None  Skin:  intact  Edema: + acewrapped knee  Range Of Motion:  AROM: Within functional limits (except Left knee limited per pain) PROM: Within functional limits (except Left knee limited per pain)           Strength:    Strength: Within functional limits (except Left knee limited per pain)                    Tone & Sensation:   Tone: Normal              Sensation: Intact               Coordination:  Coordination: Within functional limits  Vision:      Functional Mobility:  Bed Mobility:     Supine to Sit: Modified independent     Scooting: Modified independent  Transfers:  Sit to Stand: Modified independent  Stand to Sit: Modified independent  Stand Pivot Transfers: Contact guard assistance                    Balance:   Sitting: Intact; Without support  Standing: Impaired  Standing - Static: Good; Unsupported  Standing - Dynamic : Occasional;Unsupported (requires RW)  Ambulation/Gait Training:  Distance (ft): 150 Feet (ft)  Assistive Device: Gait belt;Walker, rolling  Ambulation - Level of Assistance: Contact guard assistance        Gait Abnormalities: Antalgic     Left Side Weight Bearing: As tolerated           Step Length: Left shortened    Stairs - Level of Assistance:  (NT)        Therapeutic Exercises:       Functional Measure:  Tinetti test:    Sitting Balance: 1  Arises: 1  Attempts to Rise: 2  Immediate Standing Balance: 2  Standing Balance: 1  Nudged: 2  Eyes Closed: 1  Turn 360 Degrees - Continuous/Discontinuous: 0  Turn 360 Degrees - Steady/Unsteady: 1  Sitting Down: 1  Balance Score: 12 Balance total score  Indication of Gait: 1  R Step Length/Height: 0  L Step Length/Height: 0  R Foot Clearance: 1  L Foot Clearance: 1  Step Symmetry: 1  Step Continuity: 0  Path: 1  Trunk: 0  Walking Time: 0  Gait Score: 5 Gait total score  Total Score: 17/28 Overall total score         Tinetti Tool Score Risk of Falls  <19 = High Fall Risk  19-24 = Moderate Fall Risk  25-28 = Low Fall Risk  Tinetti ME. Performance-Oriented Assessment of Mobility Problems in Elderly Patients. Olivera 66; [id number].  (Scoring Description: PT Bulletin Feb. 10, 46)    Older adults: (Jayjay et al, 2009; n = 1000 [facility] elderly evaluated with ABC, PAUL, ADL, and IADL)  · Mean PAUL score for males aged 69-68 years = 26.21(3.40)  · Mean PAUL score for females age 69-68 years = 25.16(4.30)  · Mean PAUL score for males over 80 years = 23.29(6.02)  · Mean PAUL score for females over 80 years = 17.20(8.32)           Physical Therapy Evaluation Charge Determination   History Examination Presentation Decision-Making   MEDIUM  Complexity : 1-2 comorbidities / personal factors will impact the outcome/ POC  LOW Complexity : 1-2 Standardized tests and measures addressing body structure, function, activity limitation and / or participation in recreation  LOW Complexity : Stable, uncomplicated  Other outcome measures Tinetti Test  LOW       Based on the above components, the patient evaluation is determined to be of the following complexity level: LOW     Pain Ratin/10 L Knee    Activity Tolerance: WNL    After treatment patient left in no apparent distress:   Sitting in chair, Call bell within reach, Bed / chair alarm activated, and Caregiver / family present    COMMUNICATION/EDUCATION:   The patients plan of care was discussed with: Registered nurse. Fall prevention education was provided and the patient/caregiver indicated understanding. and Patient/family have participated as able in goal setting and plan of care.     Thank you for this referral.  Arleen Orr, PT, DPT   Time Calculation: 44 mins

## 2022-08-28 NOTE — PROGRESS NOTES
Problem: Falls - Risk of  Goal: *Absence of Falls  Description: Document Darshan Smalls Fall Risk and appropriate interventions in the flowsheet. Outcome: Progressing Towards Goal  Note: Fall Risk Interventions:  Mobility Interventions: Patient to call before getting OOB         Medication Interventions: Patient to call before getting OOB    Elimination Interventions: Call light in reach, Patient to call for help with toileting needs              Problem: Patient Education: Go to Patient Education Activity  Goal: Patient/Family Education  Outcome: Progressing Towards Goal     Problem: Diabetes Self-Management  Goal: *Disease process and treatment process  Description: Define diabetes and identify own type of diabetes; list 3 options for treating diabetes. Outcome: Progressing Towards Goal  Goal: *Incorporating nutritional management into lifestyle  Description: Describe effect of type, amount and timing of food on blood glucose; list 3 methods for planning meals. Outcome: Progressing Towards Goal  Goal: *Incorporating physical activity into lifestyle  Description: State effect of exercise on blood glucose levels. Outcome: Progressing Towards Goal  Goal: *Developing strategies to promote health/change behavior  Description: Define the ABC's of diabetes; identify appropriate screenings, schedule and personal plan for screenings. Outcome: Progressing Towards Goal  Goal: *Using medications safely  Description: State effect of diabetes medications on diabetes; name diabetes medication taking, action and side effects. Outcome: Progressing Towards Goal  Goal: *Monitoring blood glucose, interpreting and using results  Description: Identify recommended blood glucose targets  and personal targets.   Outcome: Progressing Towards Goal  Goal: *Prevention, detection, treatment of acute complications  Description: List symptoms of hyper- and hypoglycemia; describe how to treat low blood sugar and actions for lowering  high blood glucose level. Outcome: Progressing Towards Goal  Goal: *Prevention, detection and treatment of chronic complications  Description: Define the natural course of diabetes and describe the relationship of blood glucose levels to long term complications of diabetes.   Outcome: Progressing Towards Goal  Goal: *Developing strategies to address psychosocial issues  Description: Describe feelings about living with diabetes; identify support needed and support network  Outcome: Progressing Towards Goal  Goal: *Insulin pump training  Outcome: Progressing Towards Goal  Goal: *Sick day guidelines  Outcome: Progressing Towards Goal  Goal: *Patient Specific Goal (EDIT GOAL, INSERT TEXT)  Outcome: Progressing Towards Goal     Problem: Patient Education: Go to Patient Education Activity  Goal: Patient/Family Education  Outcome: Progressing Towards Goal

## 2022-08-28 NOTE — PROGRESS NOTES
CM Consult Noted:   10:52 AM- Weekend CM noted CM Consult- reveiwed chart- noted pt is here from [place] visiting family. CM spoke with pt's son in law who confirmed they will be taking pt back to their house until she is fully recovered and able to fly back home. Pt's son states they cannot afford any medications for pt- will likely need assistance through the [address] depending on the medications. Floor CM updated and will continue to follow and assist as needed.      Ama Marti, NHUNG, [address]

## 2022-08-28 NOTE — PROGRESS NOTES
In to take pt blood sugar. Pt has been eating crackers during the day between meals. Blood sugar elevated. Took twice. Pt requested to take a third time using another meter. Remained high. MD notified. Orders received. Educated pt on sticking to diet orders to help maintain blood sugar levels. 1600 Lab called with culture results. Notified MD. No new orders needed as pt already receiving appropriate treatment. 1630 Pt BP elevated.  Notified MD. [facility] per MD.

## 2022-08-28 NOTE — PROGRESS NOTES
Problem: Falls - Risk of  Goal: *Absence of Falls  Description: Document Missy Blood Fall Risk and appropriate interventions in the flowsheet. Outcome: Progressing Towards Goal  Note: Fall Risk Interventions:  Mobility Interventions: Bed/chair exit alarm, Patient to call before getting OOB, Utilize gait belt for transfers/ambulation         Medication Interventions: Bed/chair exit alarm, Patient to call before getting OOB, Teach patient to arise slowly, Utilize gait belt for transfers/ambulation    Elimination Interventions: Bed/chair exit alarm, Call light in reach, Patient to call for help with toileting needs, Stay With Me (per policy)              Problem: Patient Education: Go to Patient Education Activity  Goal: Patient/Family Education  Outcome: Progressing Towards Goal     Problem: Diabetes Self-Management  Goal: *Disease process and treatment process  Description: Define diabetes and identify own type of diabetes; list 3 options for treating diabetes. Outcome: Progressing Towards Goal  Goal: *Incorporating nutritional management into lifestyle  Description: Describe effect of type, amount and timing of food on blood glucose; list 3 methods for planning meals. Outcome: Progressing Towards Goal  Goal: *Incorporating physical activity into lifestyle  Description: State effect of exercise on blood glucose levels. Outcome: Progressing Towards Goal  Goal: *Developing strategies to promote health/change behavior  Description: Define the ABC's of diabetes; identify appropriate screenings, schedule and personal plan for screenings. Outcome: Progressing Towards Goal  Goal: *Using medications safely  Description: State effect of diabetes medications on diabetes; name diabetes medication taking, action and side effects. Outcome: Progressing Towards Goal  Goal: *Monitoring blood glucose, interpreting and using results  Description: Identify recommended blood glucose targets  and personal targets.   Outcome: Progressing Towards Goal  Goal: *Prevention, detection, treatment of acute complications  Description: List symptoms of hyper- and hypoglycemia; describe how to treat low blood sugar and actions for lowering  high blood glucose level. Outcome: Progressing Towards Goal  Goal: *Prevention, detection and treatment of chronic complications  Description: Define the natural course of diabetes and describe the relationship of blood glucose levels to long term complications of diabetes.   Outcome: Progressing Towards Goal  Goal: *Developing strategies to address psychosocial issues  Description: Describe feelings about living with diabetes; identify support needed and support network  Outcome: Progressing Towards Goal  Goal: *Insulin pump training  Outcome: Progressing Towards Goal  Goal: *Sick day guidelines  Outcome: Progressing Towards Goal  Goal: *Patient Specific Goal (EDIT GOAL, INSERT TEXT)  Outcome: Progressing Towards Goal     Problem: Patient Education: Go to Patient Education Activity  Goal: Patient/Family Education  Outcome: Progressing Towards Goal     Problem: Patient Education: Go to Patient Education Activity  Goal: Patient/Family Education  Outcome: Progressing Towards Goal

## 2022-08-29 LAB
COMMENT, HOLDF: NORMAL
GLUCOSE BLD STRIP.AUTO-MCNC: 180 MG/DL (ref 65–117)
GLUCOSE BLD STRIP.AUTO-MCNC: 193 MG/DL (ref 65–117)
GLUCOSE BLD STRIP.AUTO-MCNC: 243 MG/DL (ref 65–117)
GLUCOSE BLD STRIP.AUTO-MCNC: 248 MG/DL (ref 65–117)
PHOSPHATE SERPL-MCNC: 2.8 MG/DL (ref 2.6–4.7)
SAMPLES BEING HELD,HOLD: NORMAL
SERVICE CMNT-IMP: ABNORMAL

## 2022-08-29 PROCEDURE — 99233 SBSQ HOSP IP/OBS HIGH 50: CPT | Performed by: INTERNAL MEDICINE

## 2022-08-29 PROCEDURE — 36415 COLL VENOUS BLD VENIPUNCTURE: CPT

## 2022-08-29 PROCEDURE — 97530 THERAPEUTIC ACTIVITIES: CPT

## 2022-08-29 PROCEDURE — 87040 BLOOD CULTURE FOR BACTERIA: CPT

## 2022-08-29 PROCEDURE — 82962 GLUCOSE BLOOD TEST: CPT

## 2022-08-29 PROCEDURE — 74011000250 HC RX REV CODE- 250: Performed by: ORTHOPAEDIC SURGERY

## 2022-08-29 PROCEDURE — 97116 GAIT TRAINING THERAPY: CPT

## 2022-08-29 PROCEDURE — 74011250636 HC RX REV CODE- 250/636: Performed by: INTERNAL MEDICINE

## 2022-08-29 PROCEDURE — 74011000250 HC RX REV CODE- 250: Performed by: STUDENT IN AN ORGANIZED HEALTH CARE EDUCATION/TRAINING PROGRAM

## 2022-08-29 PROCEDURE — 74011250637 HC RX REV CODE- 250/637: Performed by: INTERNAL MEDICINE

## 2022-08-29 PROCEDURE — 84100 ASSAY OF PHOSPHORUS: CPT

## 2022-08-29 PROCEDURE — 97165 OT EVAL LOW COMPLEX 30 MIN: CPT

## 2022-08-29 PROCEDURE — 74011250637 HC RX REV CODE- 250/637: Performed by: STUDENT IN AN ORGANIZED HEALTH CARE EDUCATION/TRAINING PROGRAM

## 2022-08-29 PROCEDURE — 74011250637 HC RX REV CODE- 250/637: Performed by: HOSPITALIST

## 2022-08-29 PROCEDURE — 74011250637 HC RX REV CODE- 250/637: Performed by: ORTHOPAEDIC SURGERY

## 2022-08-29 PROCEDURE — 74011000250 HC RX REV CODE- 250: Performed by: INTERNAL MEDICINE

## 2022-08-29 PROCEDURE — 65270000029 HC RM PRIVATE

## 2022-08-29 PROCEDURE — 97535 SELF CARE MNGMENT TRAINING: CPT

## 2022-08-29 PROCEDURE — 74011636637 HC RX REV CODE- 636/637: Performed by: INTERNAL MEDICINE

## 2022-08-29 RX ORDER — CANDESARTAN 16 MG/1
16 TABLET ORAL DAILY
Status: DISCONTINUED | OUTPATIENT
Start: 2022-08-30 | End: 2022-08-30

## 2022-08-29 RX ORDER — ASCORBIC ACID 500 MG
1000 TABLET ORAL 2 TIMES DAILY
Status: DISCONTINUED | OUTPATIENT
Start: 2022-08-29 | End: 2022-09-01 | Stop reason: HOSPADM

## 2022-08-29 RX ORDER — HYDROMORPHONE HYDROCHLORIDE 1 MG/ML
1 INJECTION, SOLUTION INTRAMUSCULAR; INTRAVENOUS; SUBCUTANEOUS
Status: DISCONTINUED | OUTPATIENT
Start: 2022-08-29 | End: 2022-09-01 | Stop reason: HOSPADM

## 2022-08-29 RX ORDER — HYDRALAZINE HYDROCHLORIDE 20 MG/ML
20 INJECTION INTRAMUSCULAR; INTRAVENOUS
Status: DISCONTINUED | OUTPATIENT
Start: 2022-08-29 | End: 2022-09-01 | Stop reason: HOSPADM

## 2022-08-29 RX ADMIN — VALSARTAN 320 MG: 80 TABLET, FILM COATED ORAL at 08:14

## 2022-08-29 RX ADMIN — CEFAZOLIN SODIUM 2 G: 1 POWDER, FOR SOLUTION INTRAMUSCULAR; INTRAVENOUS at 08:14

## 2022-08-29 RX ADMIN — INSULIN HUMAN 25 UNITS: 100 INJECTION, SUSPENSION SUBCUTANEOUS at 18:27

## 2022-08-29 RX ADMIN — Medication 10 ML: at 14:00

## 2022-08-29 RX ADMIN — Medication 10 ML: at 08:14

## 2022-08-29 RX ADMIN — HYDRALAZINE HYDROCHLORIDE 10 MG: 20 INJECTION, SOLUTION INTRAMUSCULAR; INTRAVENOUS at 00:45

## 2022-08-29 RX ADMIN — Medication 2 UNITS: at 11:51

## 2022-08-29 RX ADMIN — Medication 1 TABLET: at 11:51

## 2022-08-29 RX ADMIN — POLYETHYLENE GLYCOL 3350 17 G: 17 POWDER, FOR SOLUTION ORAL at 08:14

## 2022-08-29 RX ADMIN — ALUMINUM HYDROXIDE, MAGNESIUM HYDROXIDE, AND SIMETHICONE 30 ML: 200; 200; 20 SUSPENSION ORAL at 20:32

## 2022-08-29 RX ADMIN — ATENOLOL 50 MG: 50 TABLET ORAL at 08:14

## 2022-08-29 RX ADMIN — IBUPROFEN 600 MG: 600 TABLET, FILM COATED ORAL at 08:14

## 2022-08-29 RX ADMIN — OXYCODONE HYDROCHLORIDE AND ACETAMINOPHEN 1000 MG: 500 TABLET ORAL at 09:35

## 2022-08-29 RX ADMIN — CEFAZOLIN SODIUM 2 G: 1 POWDER, FOR SOLUTION INTRAMUSCULAR; INTRAVENOUS at 18:27

## 2022-08-29 RX ADMIN — Medication 10 ML: at 21:09

## 2022-08-29 RX ADMIN — CEFAZOLIN SODIUM 2 G: 1 POWDER, FOR SOLUTION INTRAMUSCULAR; INTRAVENOUS at 00:39

## 2022-08-29 RX ADMIN — Medication 3 UNITS: at 08:14

## 2022-08-29 RX ADMIN — Medication 1 TABLET: at 08:14

## 2022-08-29 RX ADMIN — INSULIN HUMAN 25 UNITS: 100 INJECTION, SUSPENSION SUBCUTANEOUS at 08:15

## 2022-08-29 RX ADMIN — ENOXAPARIN SODIUM 40 MG: 100 INJECTION SUBCUTANEOUS at 08:14

## 2022-08-29 RX ADMIN — Medication 2 UNITS: at 18:28

## 2022-08-29 RX ADMIN — Medication 1 TABLET: at 18:28

## 2022-08-29 RX ADMIN — ALUMINUM HYDROXIDE, MAGNESIUM HYDROXIDE, AND SIMETHICONE 30 ML: 200; 200; 20 SUSPENSION ORAL at 00:38

## 2022-08-29 RX ADMIN — ACETAMINOPHEN 650 MG: 325 TABLET, FILM COATED ORAL at 00:38

## 2022-08-29 RX ADMIN — ALUMINUM HYDROXIDE, MAGNESIUM HYDROXIDE, AND SIMETHICONE 30 ML: 200; 200; 20 SUSPENSION ORAL at 09:38

## 2022-08-29 RX ADMIN — DOCUSATE SODIUM 50MG AND SENNOSIDES 8.6MG 1 TABLET: 8.6; 5 TABLET, FILM COATED ORAL at 18:28

## 2022-08-29 RX ADMIN — IBUPROFEN 600 MG: 600 TABLET, FILM COATED ORAL at 18:40

## 2022-08-29 NOTE — PROGRESS NOTES
OCCUPATIONAL THERAPY EVALUATION/DISCHARGE  Patient: Chris Forbes (89 y.o. female)  Date: 8/29/2022  Primary Diagnosis: Sepsis ([facility] [address] [A41.9]  Procedure(s) (LRB):  INCISION AND DRAINAGE LEFT KNEE (Left) 2 Days Post-Op   Precautions: s/p I&D L knee  Fall, WBAT (LLE)    ASSESSMENT  Based on the objective data described below, the patient presents with pain, decreased LLE ROM limiting functional mobility and LB ADL independence s/p I & D of L knee. Pt received supine in bed with LLE elevated and ice in place reporting constant but bearable pain. Pt is very familiar with all of her medications and POC and is requesting more medication and vitamins, RN aware and giving during session today. Pt is very pleasant and talkative but knows her safety precautions and demonstrates good insight throughout session today although pt has just returned to bed from commode without assist d/t urgency per report. Pt with SBA for sit<>stand transfer and ambulates in room around bed to recliner chair. Needs min verbal cue for slow decent during stand<>sit transfer, LLE elevated and ice replaced at end of session, patient left NAD with family in room. No further acute OT needs identified at this time; please re-consult if change in status or further needs arise. Recommending home with family assistance when medically ready. Current Level of Function (ADLs/self-care): Min A for LB dressing for socks, able to don slip on shoes SBA, SBA for functional ambulation    Functional Outcome Measure: The patient scored 80/100 on the Barthel Index outcome measure    Other factors to consider for discharge: none, will be staying with family here in [address] until recovered per report     PLAN :  Recommend with staff: OOB to recliner as tolerated.  OOB to BSC/bathroom with assist x1 and RW for safety     Recommendation for discharge: (in order for the patient to meet his/her long term goals)  No skilled occupational therapy/ follow up rehabilitation needs identified at this time. This discharge recommendation:  Has not yet been discussed the attending provider and/or case management    IF patient discharges home will need the following DME: walker: rolling       SUBJECTIVE:   Patient stated I'm just taking everything day by day.     OBJECTIVE DATA SUMMARY:   HISTORY:   Past Medical History:   Diagnosis Date    Diabetes ([address]     Hypertension      Past Surgical History:   Procedure Laterality Date    HX GYN         Prior Level of Function/Environment/Context:  Expanded or extensive additional review of patient history:   Home Situation  Home Environment: Private residence  # Steps to Enter: 2  Rails to Enter: Yes  One/Two Story Residence: Two story, live on 1st floor  Living Alone: No  Support Systems: Child(cony)  Patient Expects to be Discharged to[de-identified] Home  Current DME Used/Available at Home: None    Hand dominance: Right    EXAMINATION OF PERFORMANCE DEFICITS:  Cognitive/Behavioral Status:  Neurologic State: Alert  Orientation Level: Oriented X4  Cognition: Appropriate decision making; Appropriate for age attention/concentration; Appropriate safety awareness; Follows commands  Perception: Appears intact  Perseveration: No perseveration noted  Safety/Judgement: Awareness of environment;Good awareness of safety precautions; Home safety; Insight into deficits      Hearing: Auditory  Auditory Impairment: None    Vision/Perceptual:    Tracking: Able to track stimulus in all quadrants w/o difficulty       Acuity: Within Defined Limits    Corrective Lenses: Glasses    Range of Motion:  AROM: Within functional limits (except LLE (knee))  PROM: Within functional limits     Strength:  Strength: Within functional limits       Coordination:  Coordination: Within functional limits  Fine Motor Skills-Upper: Left Intact; Right Intact    Gross Motor Skills-Upper: Left Intact; Right Intact    Tone & Sensation:  Tone: Normal  Sensation: Intact    Balance:  Sitting: Intact  Standing: Intact; With support  Standing - Static: Good; Unsupported  Standing - Dynamic : Fair;Constant support    Functional Mobility and Transfers for ADLs:  Bed Mobility:  Supine to Sit: Modified independent  Scooting: Modified independent    Transfers:  Sit to Stand: Stand-by assistance  Stand to Sit: Stand-by assistance  Bed to Chair: Stand-by assistance    ADL Assessment:  Feeding: Modified independent    Oral Facial Hygiene/Grooming: Modified Independent    Bathing: Setup;Supervision    Type of Bath: Partial    Upper Body Dressing: Setup;Supervision    Lower Body Dressing: Setup;Supervision    Toileting: Setup;Supervision       ADL Intervention and task modifications:       Lower Body Dressing Assistance  Socks: Minimum assistance (to reach over toes)      Cognitive Retraining  Safety/Judgement: Awareness of environment;Good awareness of safety precautions; Home safety; Insight into deficits    Functional Measure:    Barthel Index:  Bathin  Bladder: 10  Bowels: 10  Groomin  Dressin  Feeding: 10  Mobility: 5  Stairs: 5  Toilet Use: 10  Transfer (Bed to Chair and Back): 15  Total: 80/100      The Barthel ADL Index: Guidelines  1. The index should be used as a record of what a patient does, not as a record of what a patient could do. 2. The main aim is to establish degree of independence from any help, physical or verbal, however minor and for whatever reason. 3. The need for supervision renders the patient not independent. 4. A patient's performance should be established using the best available evidence. Asking the patient, friends/relatives and nurses are the usual sources, but direct observation and common sense are also important. However direct testing is not needed. 5. Usually the patient's performance over the preceding 24-48 hours is important, but occasionally longer periods will be relevant. 6. Middle categories imply that the patient supplies over 50 per cent of the effort.   7. Use of aids to be independent is allowed. Score Interpretation (from [facility], [place] 83)    Independent   60-79 Minimally independent   40-59 Partially dependent   20-39 Very dependent   <20 Totally dependent     -Maggy Reyes., Barthel, D.W. (1965). Functional evaluation: the Barthel Index. [facility] ([address] (1997). The Barthel activities of daily living index: self-reporting versus actual performance in the old (> or = 75 years). Journal [address] 45(7), 14 [facility], J.J.M.F, Alethea Duarte., Melinda Vaughn. (1999). Measuring the change in disability after inpatient rehabilitation; comparison of the responsiveness of the Barthel Index and Functional [place] Measure. Journal of Neurology, Neurosurgery, and Psychiatry, 66(4), 207-914. Tay Guadarrama NJEWELS.LANETTE, ERJI Harvey, & Guadalupe Singer, MMumtazA. (2004) Assessment of post-stroke quality of life in cost-effectiveness studies: The usefulness of the Barthel Index and the EuroQoL-5D.  Quality of Life Research, 15, 310-88         Occupational Therapy Evaluation Charge Determination   History Examination Decision-Making   LOW Complexity : Brief history review  LOW Complexity : 1-3 performance deficits relating to physical, cognitive , or psychosocial skils that result in activity limitations and / or participation restrictions  LOW Complexity : No comorbidities that affect functional and no verbal or physical assistance needed to complete eval tasks       Based on the above components, the patient evaluation is determined to be of the following complexity level: LOW   Pain Rating:  Did not give numeric score    Activity Tolerance:   Good, tolerates ADLs without rest breaks, and SpO2 stable on RA    After treatment patient left in no apparent distress:    Sitting in chair, Call bell within reach, Bed / chair alarm activated, and Caregiver / family present    COMMUNICATION/EDUCATION:   The patients plan of care was discussed with: Physical therapist and Registered nurse.      Thank you for this referral.  Edu Boo OT  Time Calculation: 35 mins

## 2022-08-29 NOTE — PROGRESS NOTES
Problem: Falls - Risk of  Goal: *Absence of Falls  Description: Document Tammy Jones Fall Risk and appropriate interventions in the flowsheet. Outcome: Progressing Towards Goal  Note: Fall Risk Interventions:  Mobility Interventions: Bed/chair exit alarm         Medication Interventions: Bed/chair exit alarm    Elimination Interventions: Bed/chair exit alarm              Problem: Patient Education: Go to Patient Education Activity  Goal: Patient/Family Education  Outcome: Progressing Towards Goal     Problem: Diabetes Self-Management  Goal: *Disease process and treatment process  Description: Define diabetes and identify own type of diabetes; list 3 options for treating diabetes. Outcome: Progressing Towards Goal  Goal: *Incorporating nutritional management into lifestyle  Description: Describe effect of type, amount and timing of food on blood glucose; list 3 methods for planning meals. Outcome: Progressing Towards Goal  Goal: *Incorporating physical activity into lifestyle  Description: State effect of exercise on blood glucose levels. Outcome: Progressing Towards Goal  Goal: *Developing strategies to promote health/change behavior  Description: Define the ABC's of diabetes; identify appropriate screenings, schedule and personal plan for screenings. Outcome: Progressing Towards Goal  Goal: *Using medications safely  Description: State effect of diabetes medications on diabetes; name diabetes medication taking, action and side effects. Outcome: Progressing Towards Goal  Goal: *Monitoring blood glucose, interpreting and using results  Description: Identify recommended blood glucose targets  and personal targets. Outcome: Progressing Towards Goal  Goal: *Prevention, detection, treatment of acute complications  Description: List symptoms of hyper- and hypoglycemia; describe how to treat low blood sugar and actions for lowering  high blood glucose level.   Outcome: Progressing Towards Goal  Goal: *Prevention, detection and treatment of chronic complications  Description: Define the natural course of diabetes and describe the relationship of blood glucose levels to long term complications of diabetes.   Outcome: Progressing Towards Goal  Goal: *Developing strategies to address psychosocial issues  Description: Describe feelings about living with diabetes; identify support needed and support network  Outcome: Progressing Towards Goal  Goal: *Insulin pump training  Outcome: Progressing Towards Goal  Goal: *Sick day guidelines  Outcome: Progressing Towards Goal  Goal: *Patient Specific Goal (EDIT GOAL, INSERT TEXT)  Outcome: Progressing Towards Goal     Problem: Patient Education: Go to Patient Education Activity  Goal: Patient/Family Education  Outcome: Progressing Towards Goal     Problem: Patient Education: Go to Patient Education Activity  Goal: Patient/Family Education  Outcome: Progressing Towards Goal     Problem: Knee Replacement: Post-Op Day 1  Goal: Off Pathway (Use only if patient is Off Pathway)  Outcome: Progressing Towards Goal  Goal: Activity/Safety  Outcome: Progressing Towards Goal  Goal: Diagnostic Test/Procedures  Outcome: Progressing Towards Goal  Goal: Nutrition/Diet  Outcome: Progressing Towards Goal  Goal: Medications  Outcome: Progressing Towards Goal  Goal: Respiratory  Outcome: Progressing Towards Goal  Goal: Treatments/Interventions/Procedures  Outcome: Progressing Towards Goal  Goal: Psychosocial  Outcome: Progressing Towards Goal  Goal: Discharge Planning  Outcome: Progressing Towards Goal  Goal: *Demonstrates progressive activity  Outcome: Progressing Towards Goal  Goal: *Optimal pain control at patient's stated goal  Outcome: Progressing Towards Goal  Goal: *Hemodynamically stable  Outcome: Progressing Towards Goal  Goal: *Discharge plan identified  Outcome: Progressing Towards Goal

## 2022-08-29 NOTE — PROGRESS NOTES
Bedside shift change report given to [employer] (oncoming nurse) by [facility] (offgoing nurse). Report included the following information SBAR, Intake/Output, MAR, and Recent Results.

## 2022-08-29 NOTE — PROGRESS NOTES
A Spiritual Care Partner Volunteer visited patient in [place] [id number] on 8/29/2022  Documented by:  Chaplain Parks MDiv, MS, [facility]

## 2022-08-29 NOTE — PROGRESS NOTES
Problem: Mobility Impaired (Adult and Pediatric)  Goal: *Acute Goals and Plan of Care (Insert Text)  Description: FUNCTIONAL STATUS PRIOR TO ADMISSION: Patient was independent and active without use of DME. She is from [facility]. She comes to [address] [facility] to see daughter and family every 2-3 months and stays for ~ month or longer. HOME SUPPORT PRIOR TO ADMISSION: The patient lived with daughter and her family but did not require assist. 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 Physical Therapy Goals  Initiated 8/28/2022    1. Patient will ambulate with modified independence for 250 feet with the least restrictive device within 7 day(s). 2.  Patient will ascend/descend 4 stairs with 1 handrail(s) with modified independence within 7 day(s). Outcome: Progressing Towards Goal   PHYSICAL THERAPY TREATMENT  Patient: Jose A Fajardo (32 y.o. female)  Date: 8/29/2022  Diagnosis: Sepsis ([facility] [address] [A41.9] <principal problem not specified>  Procedure(s) (LRB):  INCISION AND DRAINAGE LEFT KNEE (Left) 2 Days Post-Op  Precautions: Fall, WBAT (LLE)  Chart, physical therapy assessment, plan of care and goals were reviewed. ASSESSMENT  Patient continues with skilled PT services and is progressing towards goals. C/o mild pain but considerable stiffness with activity. Progressing overall towards goals with increasing total activity tolerance. Improving gait mechanics but she continues to require cues for sequencing with the RW and to equalized step length. Completed stair training today and safely ascended/descended 4 stairs with SBA. Encouraged a left facing side stepping technique and utilized to descend the stairs. The patient and her daughter demonstrated understanding. Will continue to progress gait distance and quality in preparation for discharge and anticipate 1-2 additional visits. She will require a RW for home use and do not anticipate discharge therapy needs.       Other factors to consider for discharge: visiting from [facility]; will need discharge IV antibiotics (per discussion)         PLAN :  Patient continues to benefit from skilled intervention to address the above impairments. Continue treatment per established plan of care. to address goals. Recommendation for discharge: (in order for the patient to meet his/her long term goals)  No skilled physical therapy/ follow up rehabilitation needs identified at this time. This discharge recommendation:  Has not yet been discussed the attending provider and/or case management    IF patient discharges home will need the following DME: rolling walker       SUBJECTIVE:   Patient stated It is mostly stiff.     OBJECTIVE DATA SUMMARY:   Critical Behavior:  Neurologic State: Alert  Orientation Level: Oriented X4  Cognition: Appropriate decision making, Appropriate for age attention/concentration, Appropriate safety awareness, Follows commands  Safety/Judgement: Awareness of environment, Good awareness of safety precautions, Home safety, Insight into deficits  Functional Mobility Training:  Bed Mobility:     Supine to Sit: Modified independent     Scooting: Modified independent        Transfers:  Sit to Stand: Modified independent  Stand to Sit: Stand-by assistance        Bed to Chair: Stand-by assistance                    Balance:  Sitting: Intact  Standing: Intact; With support  Standing - Static: Good; Unsupported  Standing - Dynamic : Fair;Constant support  Ambulation/Gait Training:  Distance (ft): 70 Feet (ft) (x2)  Assistive Device: Gait belt;Walker, rolling  Ambulation - Level of Assistance: Contact guard assistance        Gait Abnormalities: Antalgic     Left Side Weight Bearing: As tolerated           Step Length: Left shortened                    Stairs:  Number of Stairs Trained: 4  Stairs - Level of Assistance: Stand-by assistance   Rail Use: Left     Therapeutic Exercises:     Pain Rating:  3/10 left knee with activity    Activity Tolerance:   Good    After treatment patient left in no apparent distress:   Sitting in chair and Call bell within reach with ice applied    COMMUNICATION/COLLABORATION:   The patients plan of care was discussed with: Registered nurse.      Janell Vargas PT, DPT   Time Calculation: 28 mins

## 2022-08-29 NOTE — PROGRESS NOTES
Bedside shift change report given to Stephanie RN (oncoming nurse) by Andrew Chen RN (offgoing nurse). Report included the following information SBAR, Kardex, MAR, Recent Results, and Med Rec Status.

## 2022-08-29 NOTE — PROGRESS NOTES
8/29/2022  11:39 AM  Care Management Progress Note      ICD-10-CM ICD-9-CM    1. Severe sepsis (HCC)  A41.9 038.9     R65.20 995.92       2. Hemorrhagic cystitis  N30.91 595.9       3. Sepsis due to methicillin susceptible Staphylococcus aureus, unspecified whether acute organ dysfunction present ([facility] [address] [A41.01 (ICD-10-CM)]  A41.01 038.11      995.91       4. Staphylococcal arthritis of left knee (HCC) [M00.062 (ICD-10-CM)]  M00.062 711.06      041.10       5. MSSA bacteremia [R78.81, B95.61 (ICD-10-CM)]  R78.81 790.7     B95.61 041.11           RUR:  8%  Risk Level: [x]Low []Moderate []High  Value-based purchasing: [] Yes [x] No  Bundle patient: [] Yes [x] No   Specify:     Transition of care plan:  Awaiting medical clearance and DC order. PT/OT treating. ID following, and cultures pending. Home with IV abx - CM submitted referral via [employer] for sony to [facility] for anticipated IV abx (cefazolin 2gm q 8 hrs for 4 weeks (6 weeks if no MELI) as pt is uninsured and reports an inability to afford medications independently. [facility] agreed to provide pt sony for IV abx, and then PICC line care 1x week; however, they are unable to provide labs. CM contacted [facility], and they agreed to draw pt's weekly labs Brandon Hernandez) (Call Ruth Ann Villanueva at [facility] for scheduling ). Referral submitted to [facility] for pt to be screened for potential Medicaid benefits. Outpatient follow-up. Pt's family to transport.

## 2022-08-29 NOTE — PROGRESS NOTES
Problem: Falls - Risk of  Goal: *Absence of Falls  Description: Document Stephanie Thompson Fall Risk and appropriate interventions in the flowsheet. Outcome: Progressing Towards Goal  Note: Fall Risk Interventions:  Mobility Interventions: Bed/chair exit alarm         Medication Interventions: Bed/chair exit alarm    Elimination Interventions: Bed/chair exit alarm              Problem: Patient Education: Go to Patient Education Activity  Goal: Patient/Family Education  Outcome: Progressing Towards Goal     Problem: Diabetes Self-Management  Goal: *Disease process and treatment process  Description: Define diabetes and identify own type of diabetes; list 3 options for treating diabetes. Outcome: Progressing Towards Goal  Goal: *Incorporating nutritional management into lifestyle  Description: Describe effect of type, amount and timing of food on blood glucose; list 3 methods for planning meals. Outcome: Progressing Towards Goal  Goal: *Incorporating physical activity into lifestyle  Description: State effect of exercise on blood glucose levels. Outcome: Progressing Towards Goal  Goal: *Developing strategies to promote health/change behavior  Description: Define the ABC's of diabetes; identify appropriate screenings, schedule and personal plan for screenings. Outcome: Progressing Towards Goal  Goal: *Using medications safely  Description: State effect of diabetes medications on diabetes; name diabetes medication taking, action and side effects. Outcome: Progressing Towards Goal  Goal: *Monitoring blood glucose, interpreting and using results  Description: Identify recommended blood glucose targets  and personal targets. Outcome: Progressing Towards Goal  Goal: *Prevention, detection, treatment of acute complications  Description: List symptoms of hyper- and hypoglycemia; describe how to treat low blood sugar and actions for lowering  high blood glucose level.   Outcome: Progressing Towards Goal  Goal: *Prevention, detection and treatment of chronic complications  Description: Define the natural course of diabetes and describe the relationship of blood glucose levels to long term complications of diabetes.   Outcome: Progressing Towards Goal  Goal: *Developing strategies to address psychosocial issues  Description: Describe feelings about living with diabetes; identify support needed and support network  Outcome: Progressing Towards Goal  Goal: *Insulin pump training  Outcome: Progressing Towards Goal  Goal: *Sick day guidelines  Outcome: Progressing Towards Goal  Goal: *Patient Specific Goal (EDIT GOAL, INSERT TEXT)  Outcome: Progressing Towards Goal     Problem: Patient Education: Go to Patient Education Activity  Goal: Patient/Family Education  Outcome: Progressing Towards Goal     Problem: Patient Education: Go to Patient Education Activity  Goal: Patient/Family Education  Outcome: Progressing Towards Goal     Problem: Knee Replacement: Post-Op Day 1  Goal: Off Pathway (Use only if patient is Off Pathway)  Outcome: Progressing Towards Goal  Goal: Activity/Safety  Outcome: Progressing Towards Goal  Goal: Diagnostic Test/Procedures  Outcome: Progressing Towards Goal  Goal: Nutrition/Diet  Outcome: Progressing Towards Goal  Goal: Medications  Outcome: Progressing Towards Goal  Goal: Respiratory  Outcome: Progressing Towards Goal  Goal: Treatments/Interventions/Procedures  Outcome: Progressing Towards Goal  Goal: Psychosocial  Outcome: Progressing Towards Goal  Goal: Discharge Planning  Outcome: Progressing Towards Goal  Goal: *Demonstrates progressive activity  Outcome: Progressing Towards Goal  Goal: *Optimal pain control at patient's stated goal  Outcome: Progressing Towards Goal  Goal: *Hemodynamically stable  Outcome: Progressing Towards Goal  Goal: *Discharge plan identified  Outcome: Progressing Towards Goal

## 2022-08-29 NOTE — PROGRESS NOTES
Orthopedic NP Progress Note  Post Op day: 2 Days Post-Op    August 29, 2022 9:31 AM     Chris Forbes    Attending Physician: Treatment Team: Attending Provider: Sejal Mendoza MD; Consulting Provider: Abbey Castillo MD; Care Manager: Johnathan Zelaya; Utilization Review: Pipe Serna, AMBIKA; Consulting Provider: Laura Andino MD; Consulting Provider: Veronique Hampton; Primary Nurse: Ken Fragoso, RN; Primary Nurse: Negar Garza RN; Occupational Therapist: Cordell Tong OT; Physical Therapist: Gab Rosen, PT, DPT; Consulting Provider: Renay Judge NP     Vital Signs:    Patient Vitals for the past 8 hrs:   BP Temp Pulse Resp SpO2   08/29/22 0922 (!) 144/73 -- 71 -- --   08/29/22 0921 134/77 -- 74 -- --   08/29/22 0900 (!) 151/76 -- 75 -- --   08/29/22 0751 (!) 183/84 98 °F (36.7 °C) 75 17 97 %   08/29/22 0700 -- -- 78 -- --   08/29/22 0657 (!) 164/87 -- -- -- --   08/29/22 0656 (!) 165/92 -- -- -- --   08/29/22 0430 (!) 174/90 97.6 °F (36.4 °C) 70 17 99 %     BMI (calculated): 29.2 (08/28/22 1939)    Intake/Output:  No intake/output data recorded.   08/27 1901 - 08/29 0700  In: 2241.3 [P.O.:960; I.V.:1281.3]  Out: 60 [Drains:60]    Pain Control:   Pain Assessment  Pain Scale 1: Numeric (0 - 10)  Pain Intensity 1: 4  Pain Onset 1: postop  Pain Location 1: Knee  Pain Orientation 1: Left  Pain Description 1: Aching  Pain Intervention(s) 1: Medication (see MAR)    LAB:    Recent Labs     08/28/22  0301   HCT 30.5*   HGB 10.0*     Lab Results   Component Value Date/Time    Sodium 140 08/28/2022 03:01 AM    Potassium 3.5 08/28/2022 03:01 AM    Chloride 105 08/28/2022 03:01 AM    CO2 29 08/28/2022 03:01 AM    Glucose 212 (H) 08/28/2022 03:01 AM    BUN 10 08/28/2022 03:01 AM    Creatinine 0.52 (L) 08/28/2022 03:01 AM    Calcium 8.8 08/28/2022 03:01 AM       Subjective:  Chris Forbes is a 74 y.o. female s/p a  Procedure(s):  INCISION AND DRAINAGE LEFT KNEE Procedure(s):  INCISION AND DRAINAGE LEFT KNEE. Tolerating diet. Knee pain has improved, pt has been up in room        Objective: General: alert, cooperative, no distress. Neuro/Vascular: CNS Intact. Sensation stable. Brisk cap refill, 2+ pulses UE/LE  Musculoskeletal:  +ROM UE/LE, + discomfort with ROM of L knee, min edema/effusion noted to knee, incision clean and dry with drain in place       Ruiz - n  Drain - drianing jt fluid with opaque fluid mixed, 30cc out over night        PT/OT:   Gait:  Gait  Step Length: Left shortened  Gait Abnormalities: Antalgic  Ambulation - Level of Assistance: Contact guard assistance  Distance (ft): 150 Feet (ft)  Assistive Device: Gait belt, Walker, rolling  Stairs - Level of Assistance:  (NT)                   Assessment:    s/p Procedure(s):  INCISION AND DRAINAGE LEFT KNEE    Active Problems:    Sepsis ([facility] [address] (8/25/2022)         Plan:   -  s/p I&D of L knee huber to septic arthritis - aspiration cultures + staph areus, IV abx as per ID, keep drain in today, monitor outpt, pt up as tolerated .  Dressing changed today       Signed By: Arlin Villanueva NP    Orthopedic Nurse Practitioner

## 2022-08-29 NOTE — PROGRESS NOTES
[facility] Progress Note           Henrietta Lipoma DO    [phone] Office  [phone]  Fax    2022      Assessment & Plan:     MSSA bacteremia complicated by left septic native knee. Repeat blood cultures NGSF. In light of prolonged bacteremia PTA will ask cardiology to see for MELI. If this is negative for vegetation patient may complete a 4-week course of IV antibiotics. Blood cultures will need to remain sterile  for at least 48 hours prior to placing PICC line  Septic left native knee. Status post I&D 2022. Plan 4 weeks of IV cefazolin as above as long as MELI is negative. Diabetes mellitus. This is poorly controlled. Hemoglobin A1c 10.6  MSSA urine. This represents spillover from the blood  Dispo. Patient is uninsured. [facility] has agreed to take patient on his sony however will need to determine outpatient we will get lab work done. Case management is working on this          Subjective:     No new complaints    Objective:     Vitals: Visit Vitals  /70 (BP 1 Location: Left upper arm, BP Patient Position: Standing)   Pulse 69   Temp 98 °F (36.7 °C)   Resp 17   Ht 5' 5\" (1.651 m)   Wt 175 lb 8 oz (79.6 kg)   SpO2 97%   BMI 29.20 kg/m²        Tmax:  Temp (24hrs), Av.7 °F (36.5 °C), Min:97.4 °F (36.3 °C), Max:98.1 °F (36.7 °C)      Exam:   Patient is intubated:  no    Physical Examination:   General:  Alert, cooperative, no distress   Head:  Normocephalic, atraumatic. Eyes:  Conjunctivae clear   Neck: Supple       Lungs:   No distress. Chest wall:     Heart:  Regular rate and rhythm, no murmur   Abdomen:   non-distended   Extremities: Moves all. Left knee dressed   Skin: No acute rash on exposed skin   Neurologic: CNII-XII intact. No focal deficits     Labs:        No lab exists for component: ITNL   No results for input(s): CPK, CKMB, TROIQ in the last 72 hours.   Recent Labs     22  0301 22  0834    141   K 3.5 3.4*    106   CO2 29 29   BUN 10 8   CREA 0.52* 0.61   * 217*   MG 2.1  --    WBC 15.4* 16.8*   HGB 10.0* 10.8*   HCT 30.5* 32.7*    400     No results for input(s): INR, PTP, APTT, INREXT in the last 72 hours. Needs: urine analysis, urine sodium, protein and creatinine  No results found for: JANNET, CREAU      Cultures:     No results found for: SDES  Lab Results   Component Value Date/Time    Culture result: NO GROWTH AFTER 20 HOURS 08/28/2022 08:00 AM    Culture result: PENDING 08/27/2022 09:31 PM    Culture result: [address] (A) 08/27/2022 02:30 PM    Culture result:  08/27/2022 02:30 PM     (NOTE) [facility] CALLED TO Bard King RN AT 0228 8/28/22.  [facility] AT [place]       Radiology:     Medications       Current Facility-Administered Medications   Medication Dose Route Frequency Last Admin    hydrALAZINE (APRESOLINE) 20 mg/mL injection 20 mg  20 mg IntraVENous Q6H PRN      ascorbic acid (vitamin C) (VITAMIN C) tablet 1,000 mg  1,000 mg Oral BID 1,000 mg at 08/29/22 0935    HYDROmorphone (DILAUDID) injection 1 mg  1 mg IntraVENous Q4H PRN      [START ON 8/30/2022] candesartan (ATACAND) tablet 16 mg (Patient Supplied)  16 mg Oral DAILY      sodium chloride (NS) flush 5-40 mL  5-40 mL IntraVENous Q8H 10 mL at 08/29/22 0814    sodium chloride (NS) flush 5-40 mL  5-40 mL IntraVENous PRN      naloxone (NARCAN) injection 0.4 mg  0.4 mg IntraVENous PRN      calcium-vitamin D (OS-JEANNIE +D3) 500 mg-200 unit per tablet 1 Tablet  1 Tablet Oral TID WITH MEALS 1 Tablet at 08/29/22 0814    senna-docusate (PERICOLACE) 8.6-50 mg per tablet 1 Tablet  1 Tablet Oral BID 1 Tablet at 08/28/22 1729    polyethylene glycol (MIRALAX) packet 17 g  17 g Oral DAILY 17 g at 08/29/22 0814    [START ON 8/30/2022] bisacodyL (DULCOLAX) suppository 10 mg  10 mg Rectal DAILY PRN      oxyCODONE IR (ROXICODONE) tablet 2.5 mg  2.5 mg Oral Q4H PRN      ceFAZolin (ANCEF) 2 g in sterile water (preservative free) 20 mL IV syringe  2 g IntraVENous Q8H 2 g at 08/29/22 0814    insulin NPH (NOVOLIN N, HUMULIN N) injection 25 Units  25 Units SubCUTAneous ACB&D 25 Units at 08/29/22 0815    alum-mag hydroxide-simeth (MYLANTA) oral suspension 30 mL  30 mL Oral Q4H PRN 30 mL at 08/29/22 0938    atenoloL (TENORMIN) tablet 50 mg  50 mg Oral DAILY 50 mg at 08/29/22 0814    sodium chloride (NS) flush 5-40 mL  5-40 mL IntraVENous Q8H 10 mL at 08/29/22 0814    sodium chloride (NS) flush 5-40 mL  5-40 mL IntraVENous PRN      acetaminophen (TYLENOL) tablet 650 mg  650 mg Oral Q6H  mg at 08/29/22 0038    Or    acetaminophen (TYLENOL) suppository 650 mg  650 mg Rectal Q6H PRN      polyethylene glycol (MIRALAX) packet 17 g  17 g Oral DAILY PRN      ondansetron (ZOFRAN ODT) tablet 4 mg  4 mg Oral Q8H PRN      Or    ondansetron (ZOFRAN) injection 4 mg  4 mg IntraVENous Q6H PRN      guaiFENesin (ROBITUSSIN) 100 mg/5 mL oral liquid 100 mg  100 mg Oral Q4H  mg at 08/26/22 0440    insulin lispro (HUMALOG) injection   SubCUTAneous AC&HS 3 Units at 08/29/22 0814    glucose chewable tablet 16 g  4 Tablet Oral PRN      glucagon (GLUCAGEN) injection 1 mg  1 mg IntraMUSCular PRN      dextrose 10% infusion 0-250 mL  0-250 mL IntraVENous  mL at 08/27/22 2234    ibuprofen (MOTRIN) tablet 600 mg  600 mg Oral Q6H  mg at 08/29/22 0814    guaiFENesin ER (MUCINEX) tablet 600 mg  600 mg Oral Q12H 600 mg at 08/28/22 1026    enoxaparin (LOVENOX) injection 40 mg  40 mg SubCUTAneous Q24H 40 mg at 08/29/22 0814    sodium chloride (NS) flush 5-10 mL  5-10 mL IntraVENous PRN             Case discussed with: Case management, cardiology.   Greater than 35 minutes spent with patient and discussing with associated providers      Konrad Núñez DO

## 2022-08-29 NOTE — PROGRESS NOTES
Jair Cresnhaw [facility] 79  [address] [facility], [address] [place] [address]  [phone]      Medical Progress Note      NAME: Gildardo Nurse   :  1955  MRM:  [id number]    Date/Time of service: 2022  11:21 AM       Subjective:     Chief Complaint:  Patient was personally seen and examined by me during this time period. Chart reviewed. Still c/o L knee pain. No fevers       Objective:       Vitals:       Last 24hrs VS reviewed since prior progress note. Most recent are:    Visit Vitals  /70 (BP 1 Location: Left upper arm, BP Patient Position: Standing)   Pulse 69   Temp 98 °F (36.7 °C)   Resp 17   Ht 5' 5\" (1.651 m)   Wt 79.6 kg (175 lb 8 oz)   SpO2 97%   BMI 29.20 kg/m²     SpO2 Readings from Last 6 Encounters:   22 97%   22 98%    O2 Flow Rate (L/min): 2 l/min     Intake/Output Summary (Last 24 hours) at 2022 1121  Last data filed at 2022 6126  Gross per 24 hour   Intake 660 ml   Output 60 ml   Net 600 ml        Exam:     Physical Exam:    Gen:  Well-developed, well-nourished, obese, in no acute distress  HEENT:  Pink conjunctivae, PERRL, hearing intact to voice, moist mucous membranes  Neck:  Supple, without masses, thyroid non-tender  Resp:  No accessory muscle use, clear breath sounds without wheezes rales or rhonchi  Card:  No murmurs, normal S1, S2 without thrills, bruits or peripheral edema  Abd:  Soft, non-tender, non-distended, normoactive bowel sounds are present  Musc:  No cyanosis or clubbing. L knee effusion.   Pain to ROM  Skin:  No rashes, L knee dressing c/d/i  Neuro:  Cranial nerves 3-12 are grossly intact, follows commands appropriately  Psych:  Good insight, oriented to person, place and time, alert    Medications Reviewed: (see below)    Lab Data Reviewed: (see below)    ______________________________________________________________________    Medications:     Current Facility-Administered Medications   Medication Dose Route Frequency hydrALAZINE (APRESOLINE) 20 mg/mL injection 20 mg  20 mg IntraVENous Q6H PRN    ascorbic acid (vitamin C) (VITAMIN C) tablet 1,000 mg  1,000 mg Oral BID    HYDROmorphone (DILAUDID) injection 1 mg  1 mg IntraVENous Q4H PRN    [START ON 8/30/2022] candesartan (ATACAND) tablet 16 mg (Patient Supplied)  16 mg Oral DAILY    sodium chloride (NS) flush 5-40 mL  5-40 mL IntraVENous Q8H    sodium chloride (NS) flush 5-40 mL  5-40 mL IntraVENous PRN    naloxone (NARCAN) injection 0.4 mg  0.4 mg IntraVENous PRN    calcium-vitamin D (OS-JEANNIE +D3) 500 mg-200 unit per tablet 1 Tablet  1 Tablet Oral TID WITH MEALS    senna-docusate (PERICOLACE) 8.6-50 mg per tablet 1 Tablet  1 Tablet Oral BID    polyethylene glycol (MIRALAX) packet 17 g  17 g Oral DAILY    [START ON 8/30/2022] bisacodyL (DULCOLAX) suppository 10 mg  10 mg Rectal DAILY PRN    oxyCODONE IR (ROXICODONE) tablet 2.5 mg  2.5 mg Oral Q4H PRN    ceFAZolin (ANCEF) 2 g in sterile water (preservative free) 20 mL IV syringe  2 g IntraVENous Q8H    insulin NPH (NOVOLIN N, HUMULIN N) injection 25 Units  25 Units SubCUTAneous ACB&D    alum-mag hydroxide-simeth (MYLANTA) oral suspension 30 mL  30 mL Oral Q4H PRN    atenoloL (TENORMIN) tablet 50 mg  50 mg Oral DAILY    sodium chloride (NS) flush 5-40 mL  5-40 mL IntraVENous Q8H    sodium chloride (NS) flush 5-40 mL  5-40 mL IntraVENous PRN    acetaminophen (TYLENOL) tablet 650 mg  650 mg Oral Q6H PRN    Or    acetaminophen (TYLENOL) suppository 650 mg  650 mg Rectal Q6H PRN    polyethylene glycol (MIRALAX) packet 17 g  17 g Oral DAILY PRN    ondansetron (ZOFRAN ODT) tablet 4 mg  4 mg Oral Q8H PRN    Or    ondansetron (ZOFRAN) injection 4 mg  4 mg IntraVENous Q6H PRN    guaiFENesin (ROBITUSSIN) 100 mg/5 mL oral liquid 100 mg  100 mg Oral Q4H PRN    insulin lispro (HUMALOG) injection   SubCUTAneous AC&HS    glucose chewable tablet 16 g  4 Tablet Oral PRN    glucagon (GLUCAGEN) injection 1 mg  1 mg IntraMUSCular PRN    dextrose 10% infusion 0-250 mL  0-250 mL IntraVENous PRN    ibuprofen (MOTRIN) tablet 600 mg  600 mg Oral Q6H PRN    guaiFENesin ER (MUCINEX) tablet 600 mg  600 mg Oral Q12H    enoxaparin (LOVENOX) injection 40 mg  40 mg SubCUTAneous Q24H    sodium chloride (NS) flush 5-10 mL  5-10 mL IntraVENous PRN          Lab Review:     Recent Labs     08/28/22  0301 08/27/22  0834   WBC 15.4* 16.8*   HGB 10.0* 10.8*   HCT 30.5* 32.7*    400     Recent Labs     08/28/22  0301 08/27/22  0834    141   K 3.5 3.4*    106   CO2 29 29   * 217*   BUN 10 8   CREA 0.52* 0.61   CA 8.8 8.9   MG 2.1  --      Lab Results   Component Value Date/Time    Glucose (POC) 243 (H) 08/29/2022 07:57 AM    Glucose (POC) 343 (H) 08/28/2022 08:45 PM    Glucose (POC) 371 (H) 08/28/2022 03:28 PM    Glucose (POC) 392 (H) 08/28/2022 03:21 PM    Glucose (POC) 401 (H) 08/28/2022 03:19 PM          Assessment / Plan:     75 yo hx of HTN, DM, presented w/ L septic knee, MSSA bacteremia, UTI. S/p L knee I&D on 08/27    1) L septic knee: s/p I&D by Ortho on 08/27. ID also following. Cx growing MSSA. Cont IV ancef. Use IV dilaudid prn severe pain. Will need 6 weeks IV Abx    2) Sepsis/MSSA bacteremia: due to above. Cont IV abx above    3) MSSA UTI: due to bacteremia. Already on abx    4) DM type 2 w/ hyperglycemia: A1C 10.6%. Cont NPH, SSI    5) HTN: uncontrolled. Driven by pain, sepsis. Cont home candesartan, atenolol.   Use IV hydralazine prn    Total time spent with patient care: 45 Minutes **I personally saw and examined the patient during this time period**                 Care Plan discussed with: Patient    Discussed:  Care Plan    Prophylaxis:  Lovenox    Disposition:   PT, OT, RN           ___________________________________________________    Attending Physician: Aries Wang MD

## 2022-08-30 ENCOUNTER — APPOINTMENT (OUTPATIENT)
Dept: NON INVASIVE DIAGNOSTICS | Age: 67
DRG: 853 | End: 2022-08-30
Attending: NURSE PRACTITIONER
Payer: MEDICAID

## 2022-08-30 LAB
ANION GAP SERPL CALC-SCNC: 5 MMOL/L (ref 5–15)
BACTERIA SPEC CULT: ABNORMAL
BACTERIA SPEC CULT: NORMAL
BASOPHILS # BLD: 0 K/UL (ref 0–0.1)
BASOPHILS NFR BLD: 0 % (ref 0–1)
BUN SERPL-MCNC: 12 MG/DL (ref 6–20)
BUN/CREAT SERPL: 20 (ref 12–20)
CALCIUM SERPL-MCNC: 9.3 MG/DL (ref 8.5–10.1)
CHLORIDE SERPL-SCNC: 105 MMOL/L (ref 97–108)
CO2 SERPL-SCNC: 31 MMOL/L (ref 21–32)
CREAT SERPL-MCNC: 0.61 MG/DL (ref 0.55–1.02)
DIFFERENTIAL METHOD BLD: ABNORMAL
EOSINOPHIL # BLD: 0.1 K/UL (ref 0–0.4)
EOSINOPHIL NFR BLD: 1 % (ref 0–7)
ERYTHROCYTE [DISTWIDTH] IN BLOOD BY AUTOMATED COUNT: 12.7 % (ref 11.5–14.5)
GLUCOSE BLD STRIP.AUTO-MCNC: 115 MG/DL (ref 65–117)
GLUCOSE BLD STRIP.AUTO-MCNC: 120 MG/DL (ref 65–117)
GLUCOSE BLD STRIP.AUTO-MCNC: 249 MG/DL (ref 65–117)
GLUCOSE BLD STRIP.AUTO-MCNC: 285 MG/DL (ref 65–117)
GLUCOSE SERPL-MCNC: 143 MG/DL (ref 65–100)
GRAM STN SPEC: ABNORMAL
HCT VFR BLD AUTO: 31.1 % (ref 35–47)
HGB BLD-MCNC: 10.2 G/DL (ref 11.5–16)
IMM GRANULOCYTES # BLD AUTO: 0 K/UL
IMM GRANULOCYTES NFR BLD AUTO: 0 %
LYMPHOCYTES # BLD: 2.4 K/UL (ref 0.8–3.5)
LYMPHOCYTES NFR BLD: 17 % (ref 12–49)
MAGNESIUM SERPL-MCNC: 2.1 MG/DL (ref 1.6–2.4)
MCH RBC QN AUTO: 26.6 PG (ref 26–34)
MCHC RBC AUTO-ENTMCNC: 32.8 G/DL (ref 30–36.5)
MCV RBC AUTO: 81.2 FL (ref 80–99)
METAMYELOCYTES NFR BLD MANUAL: 2 %
MONOCYTES # BLD: 1.7 K/UL (ref 0–1)
MONOCYTES NFR BLD: 12 % (ref 5–13)
NEUTS BAND NFR BLD MANUAL: 2 % (ref 0–6)
NEUTS SEG # BLD: 9.5 K/UL (ref 1.8–8)
NEUTS SEG NFR BLD: 66 % (ref 32–75)
NRBC # BLD: 0.02 K/UL (ref 0–0.01)
NRBC BLD-RTO: 0.1 PER 100 WBC
PHOSPHATE SERPL-MCNC: 3.8 MG/DL (ref 2.6–4.7)
PLATELET # BLD AUTO: 466 K/UL (ref 150–400)
PMV BLD AUTO: 9.1 FL (ref 8.9–12.9)
POTASSIUM SERPL-SCNC: 3.3 MMOL/L (ref 3.5–5.1)
RBC # BLD AUTO: 3.83 M/UL (ref 3.8–5.2)
RBC MORPH BLD: ABNORMAL
SERVICE CMNT-IMP: ABNORMAL
SERVICE CMNT-IMP: NORMAL
SERVICE CMNT-IMP: NORMAL
SODIUM SERPL-SCNC: 141 MMOL/L (ref 136–145)
WBC # BLD AUTO: 13.9 K/UL (ref 3.6–11)

## 2022-08-30 PROCEDURE — 74011250637 HC RX REV CODE- 250/637: Performed by: HOSPITALIST

## 2022-08-30 PROCEDURE — 74011250637 HC RX REV CODE- 250/637: Performed by: INTERNAL MEDICINE

## 2022-08-30 PROCEDURE — 80048 BASIC METABOLIC PNL TOTAL CA: CPT

## 2022-08-30 PROCEDURE — 36415 COLL VENOUS BLD VENIPUNCTURE: CPT

## 2022-08-30 PROCEDURE — 93312 ECHO TRANSESOPHAGEAL: CPT | Performed by: INTERNAL MEDICINE

## 2022-08-30 PROCEDURE — 74011000250 HC RX REV CODE- 250: Performed by: ORTHOPAEDIC SURGERY

## 2022-08-30 PROCEDURE — 82962 GLUCOSE BLOOD TEST: CPT

## 2022-08-30 PROCEDURE — 94761 N-INVAS EAR/PLS OXIMETRY MLT: CPT

## 2022-08-30 PROCEDURE — 74011250636 HC RX REV CODE- 250/636: Performed by: STUDENT IN AN ORGANIZED HEALTH CARE EDUCATION/TRAINING PROGRAM

## 2022-08-30 PROCEDURE — 96374 THER/PROPH/DIAG INJ IV PUSH: CPT

## 2022-08-30 PROCEDURE — 74011000250 HC RX REV CODE- 250: Performed by: STUDENT IN AN ORGANIZED HEALTH CARE EDUCATION/TRAINING PROGRAM

## 2022-08-30 PROCEDURE — 93325 DOPPLER ECHO COLOR FLOW MAPG: CPT | Performed by: INTERNAL MEDICINE

## 2022-08-30 PROCEDURE — 74011000250 HC RX REV CODE- 250: Performed by: INTERNAL MEDICINE

## 2022-08-30 PROCEDURE — 83735 ASSAY OF MAGNESIUM: CPT

## 2022-08-30 PROCEDURE — 74011250637 HC RX REV CODE- 250/637: Performed by: ORTHOPAEDIC SURGERY

## 2022-08-30 PROCEDURE — 99152 MOD SED SAME PHYS/QHP 5/>YRS: CPT

## 2022-08-30 PROCEDURE — 74011250636 HC RX REV CODE- 250/636: Performed by: INTERNAL MEDICINE

## 2022-08-30 PROCEDURE — 84100 ASSAY OF PHOSPHORUS: CPT

## 2022-08-30 PROCEDURE — 85025 COMPLETE CBC W/AUTO DIFF WBC: CPT

## 2022-08-30 PROCEDURE — 74011636637 HC RX REV CODE- 636/637: Performed by: INTERNAL MEDICINE

## 2022-08-30 PROCEDURE — 99232 SBSQ HOSP IP/OBS MODERATE 35: CPT | Performed by: INTERNAL MEDICINE

## 2022-08-30 PROCEDURE — 65270000029 HC RM PRIVATE

## 2022-08-30 RX ORDER — ONDANSETRON 2 MG/ML
INJECTION INTRAMUSCULAR; INTRAVENOUS
Status: DISPENSED
Start: 2022-08-30 | End: 2022-08-30

## 2022-08-30 RX ORDER — CANDESARTAN 32 MG/1
16 TABLET ORAL DAILY
Status: DISCONTINUED | OUTPATIENT
Start: 2022-08-30 | End: 2022-09-01 | Stop reason: HOSPADM

## 2022-08-30 RX ORDER — SODIUM CHLORIDE 9 MG/ML
10 INJECTION INTRAMUSCULAR; INTRAVENOUS; SUBCUTANEOUS
Status: DISCONTINUED | OUTPATIENT
Start: 2022-08-30 | End: 2022-08-30

## 2022-08-30 RX ORDER — CANDESARTAN 32 MG/1
16 TABLET ORAL DAILY
Status: ON HOLD | COMMUNITY
End: 2022-09-01 | Stop reason: SDUPTHER

## 2022-08-30 RX ORDER — MIDAZOLAM HYDROCHLORIDE 1 MG/ML
.5-1 INJECTION, SOLUTION INTRAMUSCULAR; INTRAVENOUS
Status: DISCONTINUED | OUTPATIENT
Start: 2022-08-30 | End: 2022-08-30

## 2022-08-30 RX ORDER — POTASSIUM CHLORIDE 750 MG/1
40 TABLET, FILM COATED, EXTENDED RELEASE ORAL
Status: COMPLETED | OUTPATIENT
Start: 2022-08-30 | End: 2022-08-30

## 2022-08-30 RX ORDER — FENTANYL CITRATE 50 UG/ML
25-200 INJECTION, SOLUTION INTRAMUSCULAR; INTRAVENOUS
Status: DISCONTINUED | OUTPATIENT
Start: 2022-08-30 | End: 2022-08-30

## 2022-08-30 RX ORDER — LIDOCAINE HYDROCHLORIDE 20 MG/ML
15 SOLUTION OROPHARYNGEAL
Status: DISCONTINUED | OUTPATIENT
Start: 2022-08-30 | End: 2022-08-30

## 2022-08-30 RX ORDER — LIDOCAINE 50 MG/G
OINTMENT TOPICAL
Status: DISCONTINUED | OUTPATIENT
Start: 2022-08-30 | End: 2022-08-30

## 2022-08-30 RX ADMIN — IBUPROFEN 600 MG: 600 TABLET, FILM COATED ORAL at 12:35

## 2022-08-30 RX ADMIN — Medication: at 09:04

## 2022-08-30 RX ADMIN — CEFAZOLIN SODIUM 2 G: 1 POWDER, FOR SOLUTION INTRAMUSCULAR; INTRAVENOUS at 17:02

## 2022-08-30 RX ADMIN — Medication 5 UNITS: at 17:02

## 2022-08-30 RX ADMIN — LIDOCAINE HYDROCHLORIDE 15 ML: 20 SOLUTION ORAL at 08:56

## 2022-08-30 RX ADMIN — IBUPROFEN 600 MG: 600 TABLET, FILM COATED ORAL at 22:53

## 2022-08-30 RX ADMIN — Medication 10 ML: at 14:43

## 2022-08-30 RX ADMIN — FENTANYL CITRATE 25 MCG: 50 INJECTION, SOLUTION INTRAMUSCULAR; INTRAVENOUS at 09:29

## 2022-08-30 RX ADMIN — Medication 10 ML: at 05:56

## 2022-08-30 RX ADMIN — CANDESARTAN 16 MG: 32 TABLET ORAL at 13:16

## 2022-08-30 RX ADMIN — MIDAZOLAM 2 MG: 1 INJECTION INTRAMUSCULAR; INTRAVENOUS at 09:24

## 2022-08-30 RX ADMIN — Medication 2 UNITS: at 22:53

## 2022-08-30 RX ADMIN — CEFAZOLIN SODIUM 2 G: 1 POWDER, FOR SOLUTION INTRAMUSCULAR; INTRAVENOUS at 00:06

## 2022-08-30 RX ADMIN — Medication 10 ML: at 22:53

## 2022-08-30 RX ADMIN — Medication 10 ML: at 14:44

## 2022-08-30 RX ADMIN — Medication 1 TABLET: at 17:02

## 2022-08-30 RX ADMIN — INSULIN HUMAN 25 UNITS: 100 INJECTION, SUSPENSION SUBCUTANEOUS at 17:02

## 2022-08-30 RX ADMIN — CEFAZOLIN SODIUM 2 G: 1 POWDER, FOR SOLUTION INTRAMUSCULAR; INTRAVENOUS at 08:15

## 2022-08-30 RX ADMIN — FENTANYL CITRATE 50 MCG: 50 INJECTION, SOLUTION INTRAMUSCULAR; INTRAVENOUS at 09:24

## 2022-08-30 RX ADMIN — POTASSIUM CHLORIDE 40 MEQ: 750 TABLET, FILM COATED, EXTENDED RELEASE ORAL at 05:56

## 2022-08-30 RX ADMIN — DOCUSATE SODIUM 50MG AND SENNOSIDES 8.6MG 1 TABLET: 8.6; 5 TABLET, FILM COATED ORAL at 17:42

## 2022-08-30 RX ADMIN — OXYCODONE HYDROCHLORIDE AND ACETAMINOPHEN 1000 MG: 500 TABLET ORAL at 17:42

## 2022-08-30 RX ADMIN — BENZOCAINE: 200 SPRAY DENTAL; ORAL; PERIODONTAL at 09:03

## 2022-08-30 RX ADMIN — MIDAZOLAM 1 MG: 1 INJECTION INTRAMUSCULAR; INTRAVENOUS at 09:28

## 2022-08-30 RX ADMIN — ALUMINUM HYDROXIDE, MAGNESIUM HYDROXIDE, AND SIMETHICONE 30 ML: 200; 200; 20 SUSPENSION ORAL at 22:53

## 2022-08-30 RX ADMIN — Medication 1 TABLET: at 13:16

## 2022-08-30 RX ADMIN — ONDANSETRON 4 MG: 2 INJECTION INTRAMUSCULAR; INTRAVENOUS at 09:32

## 2022-08-30 RX ADMIN — SODIUM CHLORIDE 10 ML: 9 INJECTION INTRAMUSCULAR; INTRAVENOUS; SUBCUTANEOUS at 09:34

## 2022-08-30 RX ADMIN — ATENOLOL 50 MG: 50 TABLET ORAL at 13:15

## 2022-08-30 RX ADMIN — IBUPROFEN 600 MG: 600 TABLET, FILM COATED ORAL at 00:06

## 2022-08-30 NOTE — PROGRESS NOTES
Problem: Diabetes Self-Management  Goal: *Disease process and treatment process  Description: Define diabetes and identify own type of diabetes; list 3 options for treating diabetes. Outcome: Progressing Towards Goal  Goal: *Incorporating nutritional management into lifestyle  Description: Describe effect of type, amount and timing of food on blood glucose; list 3 methods for planning meals. Outcome: Progressing Towards Goal  Goal: *Incorporating physical activity into lifestyle  Description: State effect of exercise on blood glucose levels. Outcome: Progressing Towards Goal  Goal: *Developing strategies to promote health/change behavior  Description: Define the ABC's of diabetes; identify appropriate screenings, schedule and personal plan for screenings.   Outcome: Progressing Towards Goal     Problem: Patient Education: Go to Patient Education Activity  Goal: Patient/Family Education  Outcome: Progressing Towards Goal

## 2022-08-30 NOTE — CONSULTS
MELI already scheduled for this morning with Dr. Madeline Landrum, discussed with Dr. Porfirio Woody yesterday.

## 2022-08-30 NOTE — PROGRESS NOTES
Orthopaedic Progress Note    August 30, 2022 10:54 AM     Patient: Turner Kilpatrick MRN: [id number]  SSN: [id number]    YOB: 1955  Age: 74 y.o. Sex: female      Admit date:  8/25/2022  Admitting Physician:  Kelli Valdivia MD   Consulting Physician(s): Treatment Team: Attending Provider: Sheri Yoo MD; Consulting Provider: Linn Verdin MD; Care Manager: Clara Kinney; Utilization Review: Tank Manning RN; Consulting Provider: Wilbert Youssef MD; Consulting Provider: Kristen Roa DO; Consulting Provider: Iveth Bhat NP; Physical Therapist: Abbie White PT    SUBJECTIVE:     Turner Kilpatrick is a 74 y.o. female has recently returned from [place]. She is a little drowsy. Knee pain is improving. She can not recall any source for infection [place] symptoms. No complaints of nausea, vomiting, dizziness, lightheadedness, chest pain, or shortness of breath. OBJECTIVE:       Physical Exam:  General: Alert, cooperative, no distress. Respiratory: Respirations unlabored  Neurological:  Neurovascular exam within normal limits. Motor: + DF/PF. Musculoskeletal: Calves soft, supple, non-tender upon palpation. Left knee dressing intact with hemovac drain in place and ~20cc in the reservoir, yellow tinted, slightly purulent. Knee with increased warmth to touch and mild erythema. Dressing/Wound:  Clean, dry and intact. No significant erythema or swelling.       Vital Signs:        Patient Vitals for the past 8 hrs:   BP Temp Pulse Resp SpO2 Height Weight   08/30/22 1110 (!) 170/80 98.4 °F (36.9 °C) 75 19 95 % -- --   08/30/22 1012 (!) 155/83 -- 81 20 91 % -- --   08/30/22 1009 (!) 144/73 -- 79 16 92 % -- --   08/30/22 1006 (!) 144/74 -- 81 19 92 % -- --   08/30/22 1003 (!) 141/72 -- 78 17 96 % -- --   08/30/22 1000 (!) 144/70 -- 78 16 96 % -- --   08/30/22 0957 (!) 150/70 -- 78 15 96 % -- -- 22 0954 (!) 152/74 -- 77 15 98 % -- --   22 0951 (!) 142/71 -- 75 15 98 % -- --   22 0948 (!) 141/73 -- 76 21 98 % -- --   22 0945 (!) 151/73 -- 79 18 96 % -- --   22 0942 (!) 160/78 -- 74 14 96 % -- --   22 0939 (!) 152/75 -- 77 15 99 % -- --   22 0936 (!) 164/77 -- 79 17 95 % -- --   22 0933 (!) 201/92 -- 90 18 91 % -- --   22 0930 (!) 204/116 -- 96 21 97 % -- --   22 0927 (!) 155/83 -- 69 13 100 % -- --   22 0837 (!) 151/87 -- -- -- -- 5' 5\" (1.651 m) 79.6 kg (175 lb 8 oz)   22 0834 (!) 151/77 -- 70 20 96 % -- --   22 0820 (!) 151/87 98 °F (36.7 °C) 70 16 98 % -- --   22 0700 -- -- 73 -- -- -- --                                          Temp (24hrs), Av.2 °F (36.8 °C), Min:97.6 °F (36.4 °C), Max:98.6 °F (37 °C)      Labs:        Recent Labs     22  0334   HCT 31.1*   HGB 10.2*     Lab Results   Component Value Date/Time    Sodium 141 2022 03:34 AM    Potassium 3.3 (L) 2022 03:34 AM    Chloride 105 2022 03:34 AM    CO2 31 2022 03:34 AM    Glucose 143 (H) 2022 03:34 AM    BUN 12 2022 03:34 AM    Creatinine 0.61 2022 03:34 AM    Calcium 9.3 2022 03:34 AM       PT/OT:        Gait  Step Length: Left shortened  Gait Abnormalities: Antalgic  Ambulation - Level of Assistance: Contact guard assistance  Distance (ft): 70 Feet (ft) (x2)  Assistive Device: Gait belt, Walker, rolling  Rail Use: Left   Stairs - Level of Assistance: Stand-by assistance  Number of Stairs Trained: 4       Patient mobility  Bed Mobility Training  Supine to Sit: Modified independent  Scooting: Modified independent  Transfer Training  Sit to Stand: Modified independent  Stand to Sit: Stand-by assistance  Bed to Chair: Stand-by assistance      Gait Training  Assistive Device: Gait belt, Walker, rolling  Ambulation - Level of Assistance: Contact guard assistance  Distance (ft): 70 Feet (ft) (x2)  Stairs - Level of Assistance: Stand-by assistance  Number of Stairs Trained: 4  Rail Use: Left    Weight Bearing Status  Left Side Weight Bearing: As tolerated        ASSESSMENT / PLAN:   Active Problems:    Sepsis ([address] (8/25/2022)    Septic native left knee: MSSA - s/p I&D on 8/27/2022, plan for IV abx. (Cefazolin) X 4 weeks and will need PICC line placed. ECHO today completed and no evidence of vegetations. Pain Control: Well controlled.    DVT Prophylaxis: Lovenox daily   Hemodynamics: Moderate HTN, otherwise stable   Activity: WBAT LLE, No limits on knee ROM  Needs to work with PT/OT   Disposition: Home w/ Family (local) and then return to [facility] after abx. complete     Signed By:  Jennifer Mondragon, [address] [address]

## 2022-08-30 NOTE — PROGRESS NOTES
Attempted to work with the patient for Physical Therapy, chart reviewed and discussed with nurse patient just got back from [place] patient supine on bed when received and still little bit groggy asked to skip for now. We will continue to follow up with the patient for therapy thank you.

## 2022-08-30 NOTE — PROGRESS NOTES
8/30/22  2:31 PM    Care Management Daily Progress Note:    RUR: 8%  Level: Low  LOS: 5D    Transition of Care:   1). Awaiting medical stability and dc order  2). PICC to be placed on 8/31/22  3). Final abx order sent to [facility]- they will reach out to patients daughter for teaching  4). Labs will be drawn at [address], faxed orders to [phone], CM will call and schedule first lab draw at date of discharge. 5). Patients family will  a RW for patient  6). CM following for dc needs.      NHUNG Mcneil  Care Manager

## 2022-08-30 NOTE — PROGRESS NOTES
[facility] completed   Full report to follow. No intracardiac vegetations. Israel Armenta MD, [facility]

## 2022-08-30 NOTE — PROGRESS NOTES
Problem: Falls - Risk of  Goal: *Absence of Falls  Description: Document Alexander Mariana Fall Risk and appropriate interventions in the flowsheet.   Outcome: Progressing Towards Goal  Note: Fall Risk Interventions:  Mobility Interventions: Bed/chair exit alarm         Medication Interventions: Bed/chair exit alarm    Elimination Interventions: Call light in reach, Bed/chair exit alarm              Problem: Patient Education: Go to Patient Education Activity  Goal: Patient/Family Education  Outcome: Progressing Towards Goal

## 2022-08-30 NOTE — PROGRESS NOTES
TRANSFER - OUT REPORT:    Verbal report given to [facility] RN(name) on Odalys Estrada being transferred to 4th floor(unit) for routine post - op       Report consisted of patient's Situation, Background, Assessment and   Recommendations(SBAR). Information from the following report(s) Procedure Summary was reviewed with the receiving nurse. Opportunity for questions and clarification was provided.       Patient transported with:   Registered Nurse

## 2022-08-30 NOTE — PROGRESS NOTES
TRANSFER - IN REPORT:     Ned Meredith  received from 4th floor(unit) for ordered procedure      Chart, orders and medications reviewed. Assessment completed upon patients arrival to unit and care assumed.

## 2022-08-30 NOTE — PROGRESS NOTES
Post Discharge PICC and Antibiotic Orders    1. Diagnosis: MSSA bacteremia/septic native knee  2. Antibiotic: Cefazolin 2 g IV every 8 hours through 9/24/2022  3. Routine PICC/ Corry/ Portacath Care including PRN catheter flow management  4. Weekly labs:   _x__CBC/diff/platelets   _x__BUN/Creatinine   ___CPK   _x__AST/TotalBilirubin/AlkalinePhosphatase   __x_CRP  5. Fax lab to [phone]  Call Critical Lab Values to [phone]  6. May send to IR for line evaluation or replacement [phone]  7. Home Health to pull PIC line at end of therapy or send to IR for Corry removal  8. Allergies:     Allergies   Allergen Reactions    Penicillins Other (comments)     Was told by her mom that she was allergic  Has tolerated augmentin well in the past           Vince Ordoñez DO

## 2022-08-30 NOTE — PROGRESS NOTES
Jair Crenshaw [facility] 79  [address] [facility], [address] [place] [address]  [phone]      Medical Progress Note      NAME: Allen Mckeon   :  1955  MRM:  [id number]    Date/Time of service: 2022  1:39 PM       Subjective:     Chief Complaint:  Patient was personally seen and examined by me during this time period. Chart reviewed. S/p [place]. No fevers, chills        Objective:       Vitals:       Last 24hrs VS reviewed since prior progress note. Most recent are:    Visit Vitals  BP (!) 170/80   Pulse 75   Temp 98.4 °F (36.9 °C)   Resp 19   Ht 5' 5\" (1.651 m)   Wt 79.6 kg (175 lb 8 oz)   SpO2 95%   BMI 29.20 kg/m²     SpO2 Readings from Last 6 Encounters:   22 95%   22 98%    O2 Flow Rate (L/min): 2 l/min     Intake/Output Summary (Last 24 hours) at 2022 1339  Last data filed at 2022 2313  Gross per 24 hour   Intake 180 ml   Output 60 ml   Net 120 ml          Exam:     Physical Exam:    Gen:  Well-developed, well-nourished, obese, in no acute distress  HEENT:  Pink conjunctivae, PERRL, hearing intact to voice, moist mucous membranes  Neck:  Supple, without masses, thyroid non-tender  Resp:  No accessory muscle use, clear breath sounds without wheezes rales or rhonchi  Card:  No murmurs, normal S1, S2 without thrills, bruits or peripheral edema  Abd:  Soft, non-tender, non-distended, normoactive bowel sounds are present  Musc:  No cyanosis or clubbing. L knee effusion.   Pain to ROM  Skin:  No rashes, L knee dressing c/d/i  Neuro:  Cranial nerves 3-12 are grossly intact, follows commands appropriately  Psych:  Good insight, oriented to person, place and time, alert    Medications Reviewed: (see below)    Lab Data Reviewed: (see below)    ______________________________________________________________________    Medications:     Current Facility-Administered Medications   Medication Dose Route Frequency    ondansetron (ZOFRAN) 4 mg/2 mL injection        candesartan (ATACAND) tablet 16 mg (Patient Supplied)  16 mg Oral DAILY    hydrALAZINE (APRESOLINE) 20 mg/mL injection 20 mg  20 mg IntraVENous Q6H PRN    ascorbic acid (vitamin C) (VITAMIN C) tablet 1,000 mg  1,000 mg Oral BID    HYDROmorphone (DILAUDID) injection 1 mg  1 mg IntraVENous Q4H PRN    sodium chloride (NS) flush 5-40 mL  5-40 mL IntraVENous Q8H    sodium chloride (NS) flush 5-40 mL  5-40 mL IntraVENous PRN    naloxone (NARCAN) injection 0.4 mg  0.4 mg IntraVENous PRN    calcium-vitamin D (OS-JEANNIE +D3) 500 mg-200 unit per tablet 1 Tablet  1 Tablet Oral TID WITH MEALS    senna-docusate (PERICOLACE) 8.6-50 mg per tablet 1 Tablet  1 Tablet Oral BID    polyethylene glycol (MIRALAX) packet 17 g  17 g Oral DAILY    bisacodyL (DULCOLAX) suppository 10 mg  10 mg Rectal DAILY PRN    oxyCODONE IR (ROXICODONE) tablet 2.5 mg  2.5 mg Oral Q4H PRN    ceFAZolin (ANCEF) 2 g in sterile water (preservative free) 20 mL IV syringe  2 g IntraVENous Q8H    insulin NPH (NOVOLIN N, HUMULIN N) injection 25 Units  25 Units SubCUTAneous ACB&D    alum-mag hydroxide-simeth (MYLANTA) oral suspension 30 mL  30 mL Oral Q4H PRN    atenoloL (TENORMIN) tablet 50 mg  50 mg Oral DAILY    sodium chloride (NS) flush 5-40 mL  5-40 mL IntraVENous Q8H    sodium chloride (NS) flush 5-40 mL  5-40 mL IntraVENous PRN    acetaminophen (TYLENOL) tablet 650 mg  650 mg Oral Q6H PRN    Or    acetaminophen (TYLENOL) suppository 650 mg  650 mg Rectal Q6H PRN    polyethylene glycol (MIRALAX) packet 17 g  17 g Oral DAILY PRN    ondansetron (ZOFRAN ODT) tablet 4 mg  4 mg Oral Q8H PRN    Or    ondansetron (ZOFRAN) injection 4 mg  4 mg IntraVENous Q6H PRN    guaiFENesin (ROBITUSSIN) 100 mg/5 mL oral liquid 100 mg  100 mg Oral Q4H PRN    insulin lispro (HUMALOG) injection   SubCUTAneous AC&HS    glucose chewable tablet 16 g  4 Tablet Oral PRN    glucagon (GLUCAGEN) injection 1 mg  1 mg IntraMUSCular PRN    dextrose 10% infusion 0-250 mL  0-250 mL IntraVENous PRN    ibuprofen (MOTRIN) tablet 600 mg  600 mg Oral Q6H PRN    guaiFENesin ER (MUCINEX) tablet 600 mg  600 mg Oral Q12H    enoxaparin (LOVENOX) injection 40 mg  40 mg SubCUTAneous Q24H    sodium chloride (NS) flush 5-10 mL  5-10 mL IntraVENous PRN          Lab Review:     Recent Labs     08/30/22  0334 08/28/22  0301   WBC 13.9* 15.4*   HGB 10.2* 10.0*   HCT 31.1* 30.5*   * 381       Recent Labs     08/30/22  0334 08/29/22  1427 08/28/22  0301     --  140   K 3.3*  --  3.5     --  105   CO2 31  --  29   *  --  212*   BUN 12  --  10   CREA 0.61  --  0.52*   CA 9.3  --  8.8   MG 2.1  --  2.1   PHOS 3.8 2.8  --        Lab Results   Component Value Date/Time    Glucose (POC) 120 (H) 08/30/2022 11:14 AM    Glucose (POC) 115 08/30/2022 07:55 AM    Glucose (POC) 193 (H) 08/29/2022 09:12 PM    Glucose (POC) 248 (H) 08/29/2022 04:00 PM    Glucose (POC) 180 (H) 08/29/2022 11:30 AM          Assessment / Plan:     75 yo hx of HTN, DM, presented w/ L septic knee, MSSA bacteremia, UTI. S/p L knee I&D on 08/27    1) L septic knee: s/p I&D by Ortho on 08/27. Cx growing MSSA. Cont IV ancef. Use IV dilaudid prn severe pain. Will need 4 weeks IV Abx    2) Sepsis/MSSA bacteremia: due to above. [facility] on 08/30 neg for vegetations. Cont IV abx above    3) MSSA UTI: due to bacteremia. Already on abx    4) DM type 2 w/ hyperglycemia: A1C 10.6%. Cont NPH, SSI    5) HTN: uncontrolled. Driven by pain, sepsis. Cont home candesartan, atenolol.   Use IV hydralazine prn    6) Hypokalemia: replete    Total time spent with patient care: 35 Minutes **I personally saw and examined the patient during this time period**                 Care Plan discussed with: Patient, nursing, ID     Discussed:  Care Plan    Prophylaxis:  Lovenox    Disposition:   PT, OT, RN           ___________________________________________________    Attending Physician: Kane Reynolds MD

## 2022-08-30 NOTE — PROGRESS NOTES
[facility] Progress Note           Yvette Mathew DO    [phone] Office  [phone]  Fax    2022      Assessment & Plan:     MSSA bacteremia complicated by left septic native knee. Repeat blood cultures NGSF. MELI negative for vegetation. Plan 4-week course of cefazolin. IV antibiotic orders are in the chart. Okay for PICC placement tomorrow   Septic left native knee. Status post I&D 2022. Plan 4 weeks of IV cefazolin  Diabetes mellitus. This is poorly controlled. Hemoglobin A1c 10.6  MSSA urine. This represents spillover from the blood  Dispo. Patient is uninsured. [facility] has agreed to take patient on as sony. Labs to be done through outpatient infusion center           Subjective:     No new complaints    Objective:     Vitals: Visit Vitals  BP (!) 170/80   Pulse 75   Temp 98.4 °F (36.9 °C)   Resp 19   Ht 5' 5\" (1.651 m)   Wt 175 lb 8 oz (79.6 kg)   SpO2 95%   BMI 29.20 kg/m²          Tmax:  Temp (24hrs), Av.2 °F (36.8 °C), Min:97.6 °F (36.4 °C), Max:98.6 °F (37 °C)      Exam:   Patient is intubated:  no    Physical Examination:   General:  Alert, cooperative, no distress   Head:  Normocephalic, atraumatic. Eyes:  Conjunctivae clear   Neck: Supple       Lungs:   No distress. Chest wall:     Heart:     Abdomen:   non-distended   Extremities: Moves all. Left knee dressed   Skin: No acute rash on exposed skin   Neurologic: CNII-XII intact. No focal deficits     Labs:        No lab exists for component: ITNL   No results for input(s): CPK, CKMB, TROIQ in the last 72 hours.   Recent Labs     22  0334 22  1427 22  0301     --  140   K 3.3*  --  3.5     --  105   CO2 31  --  29   BUN 12  --  10   CREA 0.61  --  0.52*   *  --  212*   PHOS 3.8 2.8  --    MG 2.1  --  2.1   WBC 13.9*  --  15.4*   HGB 10.2*  --  10.0*   HCT 31.1*  --  30.5*   *  --  381       No results for input(s): INR, PTP, APTT, INREXT, INREXT in the last 72 hours.   Needs: urine analysis, urine sodium, protein and creatinine  No results found for: JANNET, CREAU      Cultures:     No results found for: SDES  Lab Results   Component Value Date/Time    Culture result: NO GROWTH AFTER 13 HOURS 08/29/2022 02:27 PM    Culture result: NO GROWTH 2 DAYS 08/28/2022 08:00 AM    Culture result: NO ANAEROBES ISOLATED 08/27/2022 09:31 PM    Culture result: FEW STAPHYLOCOCCUS AUREUS (A) 08/27/2022 09:31 PM       Radiology:     Medications       Current Facility-Administered Medications   Medication Dose Route Frequency Last Admin    ondansetron (ZOFRAN) 4 mg/2 mL injection          candesartan (ATACAND) tablet 16 mg (Patient Supplied)  16 mg Oral DAILY 16 mg at 08/30/22 1316    hydrALAZINE (APRESOLINE) 20 mg/mL injection 20 mg  20 mg IntraVENous Q6H PRN      ascorbic acid (vitamin C) (VITAMIN C) tablet 1,000 mg  1,000 mg Oral BID 1,000 mg at 08/29/22 0935    HYDROmorphone (DILAUDID) injection 1 mg  1 mg IntraVENous Q4H PRN      sodium chloride (NS) flush 5-40 mL  5-40 mL IntraVENous Q8H 10 mL at 08/30/22 0556    sodium chloride (NS) flush 5-40 mL  5-40 mL IntraVENous PRN      naloxone (NARCAN) injection 0.4 mg  0.4 mg IntraVENous PRN      calcium-vitamin D (OS-JEANNIE +D3) 500 mg-200 unit per tablet 1 Tablet  1 Tablet Oral TID WITH MEALS 1 Tablet at 08/30/22 1316    senna-docusate (PERICOLACE) 8.6-50 mg per tablet 1 Tablet  1 Tablet Oral BID 1 Tablet at 08/29/22 1828    polyethylene glycol (MIRALAX) packet 17 g  17 g Oral DAILY 17 g at 08/29/22 0814    bisacodyL (DULCOLAX) suppository 10 mg  10 mg Rectal DAILY PRN      oxyCODONE IR (ROXICODONE) tablet 2.5 mg  2.5 mg Oral Q4H PRN      ceFAZolin (ANCEF) 2 g in sterile water (preservative free) 20 mL IV syringe  2 g IntraVENous Q8H 2 g at 08/30/22 0815    insulin NPH (NOVOLIN N, HUMULIN N) injection 25 Units  25 Units SubCUTAneous ACB&D 25 Units at 08/29/22 1827    alum-mag hydroxide-simeth (MYLANTA) oral suspension 30 mL  30 mL Oral Q4H PRN 30 mL at 08/29/22 2032    atenoloL (TENORMIN) tablet 50 mg  50 mg Oral DAILY 50 mg at 08/30/22 1315    sodium chloride (NS) flush 5-40 mL  5-40 mL IntraVENous Q8H 10 mL at 08/30/22 0556    sodium chloride (NS) flush 5-40 mL  5-40 mL IntraVENous PRN      acetaminophen (TYLENOL) tablet 650 mg  650 mg Oral Q6H  mg at 08/29/22 0038    Or    acetaminophen (TYLENOL) suppository 650 mg  650 mg Rectal Q6H PRN      polyethylene glycol (MIRALAX) packet 17 g  17 g Oral DAILY PRN      ondansetron (ZOFRAN ODT) tablet 4 mg  4 mg Oral Q8H PRN      Or    ondansetron (ZOFRAN) injection 4 mg  4 mg IntraVENous Q6H PRN 4 mg at 08/30/22 0932    guaiFENesin (ROBITUSSIN) 100 mg/5 mL oral liquid 100 mg  100 mg Oral Q4H  mg at 08/26/22 0440    insulin lispro (HUMALOG) injection   SubCUTAneous AC&HS 2 Units at 08/29/22 1828    glucose chewable tablet 16 g  4 Tablet Oral PRN      glucagon (GLUCAGEN) injection 1 mg  1 mg IntraMUSCular PRN      dextrose 10% infusion 0-250 mL  0-250 mL IntraVENous  mL at 08/27/22 2234    ibuprofen (MOTRIN) tablet 600 mg  600 mg Oral Q6H  mg at 08/30/22 1235    guaiFENesin ER (MUCINEX) tablet 600 mg  600 mg Oral Q12H 600 mg at 08/28/22 1026    enoxaparin (LOVENOX) injection 40 mg  40 mg SubCUTAneous Q24H 40 mg at 08/29/22 0814    sodium chloride (NS) flush 5-10 mL  5-10 mL IntraVENous PRN             Case discussed with: Case management, cardiology.        Ruben Martin DO

## 2022-08-31 ENCOUNTER — APPOINTMENT (OUTPATIENT)
Dept: GENERAL RADIOLOGY | Age: 67
DRG: 853 | End: 2022-08-31
Attending: INTERNAL MEDICINE

## 2022-08-31 LAB
BACTERIA SPEC CULT: ABNORMAL
BACTERIA SPEC CULT: ABNORMAL
GLUCOSE BLD STRIP.AUTO-MCNC: 182 MG/DL (ref 65–117)
GLUCOSE BLD STRIP.AUTO-MCNC: 186 MG/DL (ref 65–117)
GLUCOSE BLD STRIP.AUTO-MCNC: 242 MG/DL (ref 65–117)
GLUCOSE BLD STRIP.AUTO-MCNC: 282 MG/DL (ref 65–117)
SERVICE CMNT-IMP: ABNORMAL

## 2022-08-31 PROCEDURE — 76937 US GUIDE VASCULAR ACCESS: CPT

## 2022-08-31 PROCEDURE — 74011250636 HC RX REV CODE- 250/636: Performed by: INTERNAL MEDICINE

## 2022-08-31 PROCEDURE — 74011000250 HC RX REV CODE- 250: Performed by: STUDENT IN AN ORGANIZED HEALTH CARE EDUCATION/TRAINING PROGRAM

## 2022-08-31 PROCEDURE — 77030018786 HC NDL GD F/USND BARD -B

## 2022-08-31 PROCEDURE — 97116 GAIT TRAINING THERAPY: CPT

## 2022-08-31 PROCEDURE — 82962 GLUCOSE BLOOD TEST: CPT

## 2022-08-31 PROCEDURE — 74011000250 HC RX REV CODE- 250: Performed by: INTERNAL MEDICINE

## 2022-08-31 PROCEDURE — 74011000250 HC RX REV CODE- 250: Performed by: ORTHOPAEDIC SURGERY

## 2022-08-31 PROCEDURE — 65270000029 HC RM PRIVATE

## 2022-08-31 PROCEDURE — 74011250637 HC RX REV CODE- 250/637: Performed by: INTERNAL MEDICINE

## 2022-08-31 PROCEDURE — C1751 CATH, INF, PER/CENT/MIDLINE: HCPCS

## 2022-08-31 PROCEDURE — 02HV33Z INSERTION OF INFUSION DEVICE INTO SUPERIOR VENA CAVA, PERCUTANEOUS APPROACH: ICD-10-PCS | Performed by: INTERNAL MEDICINE

## 2022-08-31 PROCEDURE — 74011250637 HC RX REV CODE- 250/637: Performed by: ORTHOPAEDIC SURGERY

## 2022-08-31 PROCEDURE — 97530 THERAPEUTIC ACTIVITIES: CPT

## 2022-08-31 PROCEDURE — 36573 INSJ PICC RS&I 5 YR+: CPT | Performed by: INTERNAL MEDICINE

## 2022-08-31 PROCEDURE — 74011250637 HC RX REV CODE- 250/637: Performed by: STUDENT IN AN ORGANIZED HEALTH CARE EDUCATION/TRAINING PROGRAM

## 2022-08-31 PROCEDURE — 74011636637 HC RX REV CODE- 636/637: Performed by: INTERNAL MEDICINE

## 2022-08-31 PROCEDURE — 2709999900 HC NON-CHARGEABLE SUPPLY

## 2022-08-31 RX ADMIN — Medication 1 TABLET: at 17:03

## 2022-08-31 RX ADMIN — Medication 1 TABLET: at 08:20

## 2022-08-31 RX ADMIN — Medication 10 ML: at 15:58

## 2022-08-31 RX ADMIN — Medication 3 UNITS: at 21:52

## 2022-08-31 RX ADMIN — Medication 2 UNITS: at 12:40

## 2022-08-31 RX ADMIN — Medication 10 ML: at 15:59

## 2022-08-31 RX ADMIN — Medication 1 TABLET: at 11:27

## 2022-08-31 RX ADMIN — CEFAZOLIN SODIUM 2 G: 1 POWDER, FOR SOLUTION INTRAMUSCULAR; INTRAVENOUS at 08:30

## 2022-08-31 RX ADMIN — CANDESARTAN 16 MG: 32 TABLET ORAL at 08:19

## 2022-08-31 RX ADMIN — INSULIN HUMAN 25 UNITS: 100 INJECTION, SUSPENSION SUBCUTANEOUS at 08:19

## 2022-08-31 RX ADMIN — DOCUSATE SODIUM 50MG AND SENNOSIDES 8.6MG 1 TABLET: 8.6; 5 TABLET, FILM COATED ORAL at 08:20

## 2022-08-31 RX ADMIN — ENOXAPARIN SODIUM 40 MG: 100 INJECTION SUBCUTANEOUS at 08:21

## 2022-08-31 RX ADMIN — Medication 3 UNITS: at 17:05

## 2022-08-31 RX ADMIN — IBUPROFEN 600 MG: 600 TABLET, FILM COATED ORAL at 13:34

## 2022-08-31 RX ADMIN — ACETAMINOPHEN 650 MG: 325 TABLET, FILM COATED ORAL at 08:28

## 2022-08-31 RX ADMIN — POLYETHYLENE GLYCOL 3350 17 G: 17 POWDER, FOR SOLUTION ORAL at 08:22

## 2022-08-31 RX ADMIN — ATENOLOL 50 MG: 50 TABLET ORAL at 08:21

## 2022-08-31 RX ADMIN — CEFAZOLIN SODIUM 2 G: 1 POWDER, FOR SOLUTION INTRAMUSCULAR; INTRAVENOUS at 17:02

## 2022-08-31 RX ADMIN — GUAIFENESIN 600 MG: 600 TABLET ORAL at 11:27

## 2022-08-31 RX ADMIN — DOCUSATE SODIUM 50MG AND SENNOSIDES 8.6MG 1 TABLET: 8.6; 5 TABLET, FILM COATED ORAL at 18:06

## 2022-08-31 RX ADMIN — INSULIN HUMAN 25 UNITS: 100 INJECTION, SUSPENSION SUBCUTANEOUS at 17:03

## 2022-08-31 RX ADMIN — Medication 10 ML: at 06:37

## 2022-08-31 RX ADMIN — Medication 2 UNITS: at 08:20

## 2022-08-31 RX ADMIN — CEFAZOLIN SODIUM 2 G: 1 POWDER, FOR SOLUTION INTRAMUSCULAR; INTRAVENOUS at 00:35

## 2022-08-31 NOTE — PROGRESS NOTES
Problem: Falls - Risk of  Goal: *Absence of Falls  Description: Document Alexa Led Fall Risk and appropriate interventions in the flowsheet. Outcome: Progressing Towards Goal  Note: Fall Risk Interventions:  Mobility Interventions: Bed/chair exit alarm         Medication Interventions: Patient to call before getting OOB    Elimination Interventions: Call light in reach              Problem: Diabetes Self-Management  Goal: *Using medications safely  Description: State effect of diabetes medications on diabetes; name diabetes medication taking, action and side effects.   Outcome: Progressing Towards Goal     Problem: Knee Replacement: Post-Op Day 2  Goal: Activity/Safety  Outcome: Progressing Towards Goal     Problem: Knee Replacement: Post-Op Day 2  Goal: Respiratory  Outcome: Progressing Towards Goal     Problem: Knee Replacement: Post-Op Day 2  Goal: Treatments/Interventions/Procedures  Outcome: Progressing Towards Goal

## 2022-08-31 NOTE — PROGRESS NOTES
3:12 PM  CM spoke to [facility] at [facility] and was able to schedule the appointment for labs to be drawn on 9/6/22 at 10:30 AM. Added appointment to the AVS. Macy Mata    1:44 PM  CM called the infusion center to check on the status of the appointment, found they did not receive the fax. Resent the fax to [facility] at [phone] and to [address] [place] [address] at [phone]. Macy Mata    8/31/22  10:28 AM    CM faxed lab order form to [address] [place] [address] center at [phone] requesting patient to be added to the schedule for weekly lab draws. [facility] to meet patients daughter at the hospital today for teaching. CM noted PICC has not yet been placed- called PICC team and CM informed it should be completed by 1:00 PM.     Care Management Daily Progress Note:     RUR: 8%  Level: Low  LOS: 5D     Transition of Care:   1). Awaiting medical stability and dc order  2). PICC to be placed on 8/31/22  3). Final abx order sent to [facility] 1237- 11:00 today for teaching   4). Labs will be drawn at [facility], faxed orders to [phone]). 5). Patients family will  a RW for patient  6). CM following for dc needs.      NHUNG Shaffer  Care Manager

## 2022-08-31 NOTE — PROGRESS NOTES
Problem: Mobility Impaired (Adult and Pediatric)  Goal: *Acute Goals and Plan of Care (Insert Text)  Description: FUNCTIONAL STATUS PRIOR TO ADMISSION: Patient was independent and active without use of DME. She is from [place]. She comes to [address] to see daughter and family every 2-3 months and stays for ~ month or longer. HOME SUPPORT PRIOR TO ADMISSION: The patient lived with daughter and her family but did not require assist. 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 Physical Therapy Goals  Initiated 8/28/2022    1. Patient will ambulate with modified independence for 250 feet with the least restrictive device within 7 day(s). 2.  Patient will ascend/descend 4 stairs with 1 handrail(s) with modified independence within 7 day(s). Outcome: Progressing Towards Goal   PHYSICAL THERAPY TREATMENT  Patient: Mireya Stevens (17 y.o. female)  Date: 8/31/2022  Diagnosis: Sepsis ([address] [A41.9] <principal problem not specified>  Procedure(s) (LRB):  INCISION AND DRAINAGE LEFT KNEE (Left) 4 Days Post-Op  Precautions: Fall, WBAT (LLE)  Chart, physical therapy assessment, plan of care and goals were reviewed. ASSESSMENT  Patient continues with skilled PT services and is progressing towards goals. Pt seen with supportive daughter at bedside. Personal walker present and adjusted for proper height. Pt mobilized with modified indep for bed mob and transfers. Tolerated gait training on unit with personal RW and CGA/ SBA, cues for keeping walker slightly forward due to pt tendency to step too close to device. Instructed pt in quad sets and AROM L knee flex tolerance; able to return demo and verbalized understanding. Pt demo good awareness of proper positioning L LE in bed to promote knee ext. Pt notes that TIMUR was trained as PT in home country. Daughter and TIMUR able to provide pt with support at home.  Pt will benefit from additional PT session in a.m. to reinforce proper use of RW in prep for return home with family when medically stable. Do not anticipate follow up d/c needs. Current Level of Function Impacting Discharge (mobility/balance): bed mob modified indep, transfer modified indep, amb with RW CGA/ SBA    Other factors to consider for discharge: family assist available at home, TIMUR is PT per daughter report         PLAN :  Patient continues to benefit from skilled intervention to address the above impairments. Continue treatment per established plan of care. to address goals. Recommendation for discharge: (in order for the patient to meet his/her long term goals)  No skilled physical therapy/ follow up rehabilitation needs identified at this time.     This discharge recommendation:  Has been made in collaboration with the attending provider and/or case management    IF patient discharges home will need the following DME: patient owns DME required for discharge; adjusted personal walker for proper height       SUBJECTIVE:   Patient stated I have some pain now re after amb    OBJECTIVE DATA SUMMARY:   Critical Behavior:  Neurologic State: Alert  Orientation Level: Oriented X4  Cognition: Appropriate decision making  Safety/Judgement: Awareness of environment, Good awareness of safety precautions, Home safety, Insight into deficits  Functional Mobility Training:  Bed Mobility:     Supine to Sit: Modified independent  Sit to Supine: Modified independent           Transfers:  Sit to Stand: Modified independent  Stand to Sit: Modified independent                             Balance:  Sitting: Intact  Standing: Impaired  Standing - Static: Good  Standing - Dynamic : Fair;Good;Constant support  Ambulation/Gait Training:  Distance (ft): 75 Feet (ft)  Assistive Device: Gait belt;Walker, rolling  Ambulation - Level of Assistance: Contact guard assistance        Gait Abnormalities: Antalgic           Stance: Left decreased  Speed/Shira: Pace decreased (<100 feet/min)     Swing Pattern: Left asymmetrical Therapeutic Exercises:   Pt instructed in quad sets and AROM L knee flex exercises; able to return demo  Pain Rating:  Pt reports pain L knee after amb, daughter called nurse to request medication    Activity Tolerance:   Good    After treatment patient left in no apparent distress:   Sitting up in bed, call bell in reach, daughter present    COMMUNICATION/COLLABORATION:   The patients plan of care was discussed with: Registered nurse.      Ruthann Man, PT   Time Calculation: 24 mins

## 2022-08-31 NOTE — PROGRESS NOTES
Rounded on [facility] patients and provided Anointing of the Sick at request of patient.     Arleen De Leon

## 2022-08-31 NOTE — PROGRESS NOTES
Orthopaedic Progress Note  Post Op day: 4 Days Post-Op    2022 2:13 PM     Patient: Black Chavez MRN: [id number]  SSN: [id number]    YOB: 1955  Age: 74 y.o. Sex: female      Admit date:  2022  Date of Surgery:  2022   Procedures:  Procedure(s):  INCISION AND DRAINAGE LEFT KNEE  Admitting Physician:  Marlon Carter MD   Surgeon:  Tuan Pope) and Role:     * Jeremie Fitzpatrick MD - Primary    Consulting Physician(s): Treatment Team: Attending Provider: Maura New MD; Consulting Provider: Arthur Keene MD; Utilization Review: Pastor Mee RN; Consulting Provider: Jeremie Fitzpatrick MD; Consulting Provider: Cyndi Thomas DO; Consulting Provider: Josette Mohr NP; Physical Therapist: Bobbi Londono PT; Care Manager: Monica France    SUBJECTIVE:     Black Chavez is a 74 y.o. female is 4 Days Post-Op s/p Procedure(s):  INCISION AND DRAINAGE LEFT KNEE with an appropriate level of post-operative pain. She still has the drain in place. There is purulent drainage in the HV drain. No complaints of nausea, vomiting, dizziness, lightheadedness, chest pain, or shortness of breath. OBJECTIVE:       Physical Exam:  General: Alert, cooperative, no distress. Respiratory: Respirations unlabored  Neurological:  Neurovascular exam within normal limits. Motor: + DF/PF. Musculoskeletal: Calves soft, supple, non-tender upon palpation. Small effusion. Drain in place. HV drain with purulent fluid in it. PROM: 0-30 degrees. Minimal pain with ROM. No erythema present. Dressing/Wound:  Clean, dry and intact. No significant erythema.     Vital Signs:      Patient Vitals for the past 8 hrs:   BP Temp Pulse Resp SpO2   22 1212 (!) 141/73 97.3 °F (36.3 °C) 64 16 97 %   22 0753 (!) 177/84 98.3 °F (36.8 °C) 69 16 99 %                                          Temp (24hrs), Av.9 °F (36.6 °C), Min:97.3 °F (36.3 °C), Max:98.8 °F (37.1 °C)      Labs:        Recent Labs     08/30/22  0334   HCT 31.1*   HGB 10.2*     Lab Results   Component Value Date/Time    Sodium 141 08/30/2022 03:34 AM    Potassium 3.3 (L) 08/30/2022 03:34 AM    Chloride 105 08/30/2022 03:34 AM    CO2 31 08/30/2022 03:34 AM    Glucose 143 (H) 08/30/2022 03:34 AM    BUN 12 08/30/2022 03:34 AM    Creatinine 0.61 08/30/2022 03:34 AM    Calcium 9.3 08/30/2022 03:34 AM       PT/OT:        Gait  Speed/Shira: Pace decreased (<100 feet/min)  Step Length: Left shortened  Swing Pattern: Left asymmetrical  Stance: Left decreased  Gait Abnormalities: Antalgic  Ambulation - Level of Assistance: Contact guard assistance  Distance (ft): 75 Feet (ft)  Assistive Device: Gait belt, Walker, rolling  Rail Use: Left   Stairs - Level of Assistance: Stand-by assistance  Number of Stairs Trained: 4       Patient mobility  Bed Mobility Training  Supine to Sit: Modified independent  Sit to Supine: Modified independent  Scooting: Modified independent  Transfer Training  Sit to Stand: Modified independent  Stand to Sit: Modified independent  Bed to Chair: Stand-by assistance      Gait Training  Assistive Device: Gait belt, Walker, rolling  Ambulation - Level of Assistance: Contact guard assistance  Distance (ft): 75 Feet (ft)  Stairs - Level of Assistance: Stand-by assistance  Number of Stairs Trained: 4  Rail Use: Left    Weight Bearing Status  Left Side Weight Bearing: As tolerated        ASSESSMENT / PLAN:   Active Problems:    Sepsis ([address] (8/25/2022)         A: 1. S/P left knee I and D POD 4  2. MSSA bacteremia  3. MSSA septic native left knee    P: 1. WBAT with PT/OT. Avoid excessive ROM of left knee for 1-2 weeks. 2. Remove HV drain today. 3. Will re-assess tomorrow and if worsening pain or ROM after HV removal would consider repeat I and D. Continue with IV antibiotics per Dr. Mitzi Alvarez. 4. DVT ppx: Lovenox 40 mg SC daily. DVT ppx x 1 month.   Can transition to ASA 81 BID on discharge. 5. Orthopedics to follow.        Signed By:  Leroy Suggs, [address] [address]

## 2022-08-31 NOTE — PROGRESS NOTES
Problem: Falls - Risk of  Goal: *Absence of Falls  Description: Document Alex Griggs Fall Risk and appropriate interventions in the flowsheet. Outcome: Progressing Towards Goal  Note: Fall Risk Interventions:  Mobility Interventions: Utilize walker, cane, or other assistive device, Patient to call before getting OOB, PT Consult for mobility concerns         Medication Interventions: Teach patient to arise slowly, Patient to call before getting OOB    Elimination Interventions: Patient to call for help with toileting needs, Toilet paper/wipes in reach, Toileting schedule/hourly rounds, Call light in reach              Problem: Patient Education: Go to Patient Education Activity  Goal: Patient/Family Education  Outcome: Progressing Towards Goal     Problem: Diabetes Self-Management  Goal: *Disease process and treatment process  Description: Define diabetes and identify own type of diabetes; list 3 options for treating diabetes. Outcome: Progressing Towards Goal  Goal: *Incorporating nutritional management into lifestyle  Description: Describe effect of type, amount and timing of food on blood glucose; list 3 methods for planning meals. Outcome: Progressing Towards Goal  Goal: *Incorporating physical activity into lifestyle  Description: State effect of exercise on blood glucose levels. Outcome: Progressing Towards Goal  Goal: *Developing strategies to promote health/change behavior  Description: Define the ABC's of diabetes; identify appropriate screenings, schedule and personal plan for screenings. Outcome: Progressing Towards Goal  Goal: *Using medications safely  Description: State effect of diabetes medications on diabetes; name diabetes medication taking, action and side effects. Outcome: Progressing Towards Goal  Goal: *Monitoring blood glucose, interpreting and using results  Description: Identify recommended blood glucose targets  and personal targets.   Outcome: Progressing Towards Goal  Goal: *Prevention, detection, treatment of acute complications  Description: List symptoms of hyper- and hypoglycemia; describe how to treat low blood sugar and actions for lowering  high blood glucose level. Outcome: Progressing Towards Goal  Goal: *Prevention, detection and treatment of chronic complications  Description: Define the natural course of diabetes and describe the relationship of blood glucose levels to long term complications of diabetes.   Outcome: Progressing Towards Goal

## 2022-08-31 NOTE — PROGRESS NOTES
Physical Therapy    Attempted PT treatment. Pt received in supine, sleeping. Awakened to verbal stim, but requested defer activity at this time due to poor sleep last night, needing time to sleep. Will follow up later today as able and appropriate.     Tonie Nolan, PT, MPT

## 2022-08-31 NOTE — PROGRESS NOTES
Jair Crenshaw [facility] 79  [address] [facility], [address] [place] [address]  [phone]      Medical Progress Note      NAME: Jose Carlos Fajardo   :  1955  MRM:  [id number]    Date/Time of service: 2022  1:04 PM       Subjective:     Chief Complaint:  Patient was personally seen and examined by me during this time period. Chart reviewed. Still has drain in L knee. No fevers       Objective:       Vitals:       Last 24hrs VS reviewed since prior progress note. Most recent are:    Visit Vitals  BP (!) 141/73 (BP 1 Location: Left upper arm, BP Patient Position: At rest;Lying)   Pulse 64   Temp 97.3 °F (36.3 °C)   Resp 16   Ht 5' 5\" (1.651 m)   Wt 79.6 kg (175 lb 8 oz)   SpO2 97%   BMI 29.20 kg/m²     SpO2 Readings from Last 6 Encounters:   22 97%   22 98%    O2 Flow Rate (L/min): 2 l/min     Intake/Output Summary (Last 24 hours) at 2022 1304  Last data filed at 2022 0500  Gross per 24 hour   Intake --   Output 70 ml   Net -70 ml          Exam:     Physical Exam:    Gen:  Well-developed, well-nourished, obese, in no acute distress  HEENT:  Pink conjunctivae, PERRL, hearing intact to voice, moist mucous membranes  Neck:  Supple, without masses, thyroid non-tender  Resp:  No accessory muscle use, clear breath sounds without wheezes rales or rhonchi  Card:  No murmurs, normal S1, S2 without thrills, bruits or peripheral edema  Abd:  Soft, non-tender, non-distended, normoactive bowel sounds are present  Musc:  No cyanosis or clubbing. L knee effusion. Pain to ROM  Skin:  No rashes, L knee dressing c/d/I.   Has drain   Neuro:  Cranial nerves 3-12 are grossly intact, follows commands appropriately  Psych:  Good insight, oriented to person, place and time, alert    Medications Reviewed: (see below)    Lab Data Reviewed: (see below)    ______________________________________________________________________    Medications:     Current Facility-Administered Medications Medication Dose Route Frequency    alteplase (CATHFLO) 1 mg in sterile water (preservative free) 1 mL injection  1 mg InterCATHeter PRN    candesartan (ATACAND) tablet 16 mg (Patient Supplied)  16 mg Oral DAILY    hydrALAZINE (APRESOLINE) 20 mg/mL injection 20 mg  20 mg IntraVENous Q6H PRN    ascorbic acid (vitamin C) (VITAMIN C) tablet 1,000 mg  1,000 mg Oral BID    HYDROmorphone (DILAUDID) injection 1 mg  1 mg IntraVENous Q4H PRN    sodium chloride (NS) flush 5-40 mL  5-40 mL IntraVENous Q8H    sodium chloride (NS) flush 5-40 mL  5-40 mL IntraVENous PRN    naloxone (NARCAN) injection 0.4 mg  0.4 mg IntraVENous PRN    calcium-vitamin D (OS-JEANNIE +D3) 500 mg-200 unit per tablet 1 Tablet  1 Tablet Oral TID WITH MEALS    senna-docusate (PERICOLACE) 8.6-50 mg per tablet 1 Tablet  1 Tablet Oral BID    polyethylene glycol (MIRALAX) packet 17 g  17 g Oral DAILY    bisacodyL (DULCOLAX) suppository 10 mg  10 mg Rectal DAILY PRN    oxyCODONE IR (ROXICODONE) tablet 2.5 mg  2.5 mg Oral Q4H PRN    ceFAZolin (ANCEF) 2 g in sterile water (preservative free) 20 mL IV syringe  2 g IntraVENous Q8H    insulin NPH (NOVOLIN N, HUMULIN N) injection 25 Units  25 Units SubCUTAneous ACB&D    alum-mag hydroxide-simeth (MYLANTA) oral suspension 30 mL  30 mL Oral Q4H PRN    atenoloL (TENORMIN) tablet 50 mg  50 mg Oral DAILY    sodium chloride (NS) flush 5-40 mL  5-40 mL IntraVENous Q8H    sodium chloride (NS) flush 5-40 mL  5-40 mL IntraVENous PRN    acetaminophen (TYLENOL) tablet 650 mg  650 mg Oral Q6H PRN    Or    acetaminophen (TYLENOL) suppository 650 mg  650 mg Rectal Q6H PRN    polyethylene glycol (MIRALAX) packet 17 g  17 g Oral DAILY PRN    ondansetron (ZOFRAN ODT) tablet 4 mg  4 mg Oral Q8H PRN    Or    ondansetron (ZOFRAN) injection 4 mg  4 mg IntraVENous Q6H PRN    guaiFENesin (ROBITUSSIN) 100 mg/5 mL oral liquid 100 mg  100 mg Oral Q4H PRN    insulin lispro (HUMALOG) injection   SubCUTAneous AC&HS    glucose chewable tablet 16 g 4 Tablet Oral PRN    glucagon (GLUCAGEN) injection 1 mg  1 mg IntraMUSCular PRN    dextrose 10% infusion 0-250 mL  0-250 mL IntraVENous PRN    ibuprofen (MOTRIN) tablet 600 mg  600 mg Oral Q6H PRN    guaiFENesin ER (MUCINEX) tablet 600 mg  600 mg Oral Q12H    enoxaparin (LOVENOX) injection 40 mg  40 mg SubCUTAneous Q24H    sodium chloride (NS) flush 5-10 mL  5-10 mL IntraVENous PRN          Lab Review:     Recent Labs     08/30/22  0334   WBC 13.9*   HGB 10.2*   HCT 31.1*   *       Recent Labs     08/30/22  0334 08/29/22  1427     --    K 3.3*  --      --    CO2 31  --    *  --    BUN 12  --    CREA 0.61  --    CA 9.3  --    MG 2.1  --    PHOS 3.8 2.8       Lab Results   Component Value Date/Time    Glucose (POC) 186 (H) 08/31/2022 11:33 AM    Glucose (POC) 182 (H) 08/31/2022 08:02 AM    Glucose (POC) 249 (H) 08/30/2022 10:16 PM    Glucose (POC) 285 (H) 08/30/2022 04:42 PM    Glucose (POC) 120 (H) 08/30/2022 11:14 AM          Assessment / Plan:     73 yo hx of HTN, DM, presented w/ L septic knee, MSSA bacteremia, UTI. S/p L knee I&D on 08/27    1) L septic knee: s/p I&D by Ortho on 08/27. Cx growing MSSA. Use IV dilaudid prn severe pain. Will need  IV ancef 2gm every 8h until 09/24 (per ID). Will obtain PICC line today. Awaiting Ortho to pull knee drain     2) Sepsis/MSSA bacteremia: due to above. MELI on 08/30 neg for vegetations. Cont IV abx above    3) MSSA UTI: due to bacteremia. Already on abx    4) DM type 2 w/ hyperglycemia: A1C 10.6%. Cont NPH, SSI    5) HTN: uncontrolled. Driven by pain, sepsis. Cont home candesartan, atenolol.   Use IV hydralazine prn    6) Hypokalemia: repleted    Total time spent with patient care: 28 Minutes **I personally saw and examined the patient during this time period**                 Care Plan discussed with: Patient, nursing, family     Discussed:  Care Plan    Prophylaxis:  Lovenox    Disposition:   PT, OT, RN           ___________________________________________________    Attending Physician: Aries Wang MD

## 2022-08-31 NOTE — PROGRESS NOTES
PICC Placement Note    PRE-PROCEDURE VERIFICATION  Correct Procedure: yes  Correct Site:  yes  Temperature: Temp: 97.3 °F (36.3 °C), Temperature Source: Temp Source: Oral  Recent Labs     08/30/22  0334   BUN 12   CREA 0.61   *   WBC 13.9*     Allergies: Penicillins  Education materials for PICC Care given: yes. See Patient Education activity for further details. PICC Booklet placed at bedside: yes    Closed Ended PICC Catheters:  Flush Lumens as Follows:  Intermittent Medication:   Flush before and after each medication with 10 ml NS. Unused Ports:  Flush every 8 hours with 10 ml NS.  TPN Ports:  Flush every 24 hours with 20 ml NS prior to hanging new bag. Blood Draws: Stop infusion, draw off and waste 10 ml of blood. Draw sample with 10cc syringe or greater. DO NOT USE VACUTAINER . Transfer with appropriate device to lab  tubes. Flush with 20 ml NS. Dressing Change:  Every 7 days, and PRN using sterile technique if integrity of dressing is compromised. Initial dressing change for central line 24-48 hours post insertion if gauze is used. Apply new dressing per policy. PROCEDURE DETAIL  Consent was obtained and all questions were answered related to risks and benefits. A single lumen PICC line was inserted, as a sterile procedure using ultrasound and modified Seldinger technique for antibiotic therapy and Home IV Therapy. The following documentation is in addition to the PICC properties in the lines/airways flowsheet :  Lot #: [id number]  Lidocaine 1% administered intradermally :yes  Internal Catheter Total Length: 37 (cm) 2 cm out  Vein Selection for PICC:right basilic  Central Line Bundle followed yes  Complication Related to Insertion:no    The placement was verified by EKG, MAX P WAVE @ 37 (cm)2 cm out. PER EKG PICC TIP @ C/A junction.        Line is okay to use: yes    Haritha Hawkins RN

## 2022-08-31 NOTE — PROGRESS NOTES
Bedside shift change report given to Matthew Lewis (oncoming nurse) by [facility] (offgoing nurse). Report included the following information SBAR, ED Summary, Intake/Output, MAR, and Recent Results.

## 2022-09-01 VITALS
HEIGHT: 65 IN | RESPIRATION RATE: 18 BRPM | OXYGEN SATURATION: 98 % | HEART RATE: 70 BPM | BODY MASS INDEX: 32.3 KG/M2 | WEIGHT: 193.9 LBS | DIASTOLIC BLOOD PRESSURE: 75 MMHG | TEMPERATURE: 98.6 F | SYSTOLIC BLOOD PRESSURE: 142 MMHG

## 2022-09-01 PROBLEM — M00.9 SEPTIC JOINT OF LEFT KNEE JOINT (HCC): Status: ACTIVE | Noted: 2022-09-01

## 2022-09-01 PROBLEM — R78.81 MSSA BACTEREMIA: Status: ACTIVE | Noted: 2022-09-01

## 2022-09-01 PROBLEM — B95.61 MSSA BACTEREMIA: Status: ACTIVE | Noted: 2022-09-01

## 2022-09-01 LAB
GLUCOSE BLD STRIP.AUTO-MCNC: 130 MG/DL (ref 65–117)
GLUCOSE BLD STRIP.AUTO-MCNC: 204 MG/DL (ref 65–117)
GLUCOSE BLD STRIP.AUTO-MCNC: 219 MG/DL (ref 65–117)
SERVICE CMNT-IMP: ABNORMAL

## 2022-09-01 PROCEDURE — 74011000250 HC RX REV CODE- 250: Performed by: ORTHOPAEDIC SURGERY

## 2022-09-01 PROCEDURE — 74011250637 HC RX REV CODE- 250/637: Performed by: ORTHOPAEDIC SURGERY

## 2022-09-01 PROCEDURE — 74011250637 HC RX REV CODE- 250/637: Performed by: HOSPITALIST

## 2022-09-01 PROCEDURE — 82962 GLUCOSE BLOOD TEST: CPT

## 2022-09-01 PROCEDURE — 74011250636 HC RX REV CODE- 250/636: Performed by: INTERNAL MEDICINE

## 2022-09-01 PROCEDURE — 74011000250 HC RX REV CODE- 250: Performed by: INTERNAL MEDICINE

## 2022-09-01 PROCEDURE — 97116 GAIT TRAINING THERAPY: CPT

## 2022-09-01 PROCEDURE — 74011250637 HC RX REV CODE- 250/637: Performed by: INTERNAL MEDICINE

## 2022-09-01 PROCEDURE — 74011000250 HC RX REV CODE- 250: Performed by: STUDENT IN AN ORGANIZED HEALTH CARE EDUCATION/TRAINING PROGRAM

## 2022-09-01 PROCEDURE — 74011250637 HC RX REV CODE- 250/637: Performed by: STUDENT IN AN ORGANIZED HEALTH CARE EDUCATION/TRAINING PROGRAM

## 2022-09-01 PROCEDURE — 74011636637 HC RX REV CODE- 636/637: Performed by: INTERNAL MEDICINE

## 2022-09-01 PROCEDURE — 97530 THERAPEUTIC ACTIVITIES: CPT

## 2022-09-01 RX ORDER — IBUPROFEN 600 MG/1
600 TABLET ORAL
Qty: 30 TABLET | Refills: 0 | Status: SHIPPED | OUTPATIENT
Start: 2022-09-01

## 2022-09-01 RX ORDER — ATENOLOL 50 MG/1
50 TABLET ORAL DAILY
Qty: 30 TABLET | Refills: 0 | Status: SHIPPED | OUTPATIENT
Start: 2022-09-01

## 2022-09-01 RX ORDER — AMOXICILLIN 250 MG
1 CAPSULE ORAL 2 TIMES DAILY
Qty: 60 TABLET | Refills: 0 | Status: SHIPPED | OUTPATIENT
Start: 2022-09-01

## 2022-09-01 RX ORDER — GUAIFENESIN 100 MG/5ML
81 LIQUID (ML) ORAL 2 TIMES DAILY
Qty: 60 TABLET | Refills: 0 | Status: SHIPPED | OUTPATIENT
Start: 2022-09-01

## 2022-09-01 RX ORDER — CANDESARTAN 32 MG/1
16 TABLET ORAL DAILY
Qty: 30 TABLET | Refills: 0 | Status: SHIPPED | OUTPATIENT
Start: 2022-09-01

## 2022-09-01 RX ORDER — OXYCODONE HYDROCHLORIDE 5 MG/1
5 TABLET ORAL
Qty: 15 TABLET | Refills: 0 | Status: SHIPPED | OUTPATIENT
Start: 2022-09-01 | End: 2022-09-06

## 2022-09-01 RX ADMIN — INSULIN HUMAN 25 UNITS: 100 INJECTION, SUSPENSION SUBCUTANEOUS at 09:04

## 2022-09-01 RX ADMIN — ALUMINUM HYDROXIDE, MAGNESIUM HYDROXIDE, AND SIMETHICONE 30 ML: 200; 200; 20 SUSPENSION ORAL at 00:04

## 2022-09-01 RX ADMIN — CEFAZOLIN SODIUM 2 G: 1 POWDER, FOR SOLUTION INTRAMUSCULAR; INTRAVENOUS at 09:09

## 2022-09-01 RX ADMIN — Medication 10 ML: at 06:49

## 2022-09-01 RX ADMIN — Medication 10 ML: at 13:48

## 2022-09-01 RX ADMIN — Medication 10 ML: at 00:17

## 2022-09-01 RX ADMIN — Medication 3 UNITS: at 17:02

## 2022-09-01 RX ADMIN — Medication 1 TABLET: at 16:13

## 2022-09-01 RX ADMIN — Medication 10 ML: at 00:18

## 2022-09-01 RX ADMIN — ACETAMINOPHEN 650 MG: 325 TABLET, FILM COATED ORAL at 17:03

## 2022-09-01 RX ADMIN — Medication 3 UNITS: at 12:40

## 2022-09-01 RX ADMIN — IBUPROFEN 600 MG: 600 TABLET, FILM COATED ORAL at 00:10

## 2022-09-01 RX ADMIN — DOCUSATE SODIUM 50MG AND SENNOSIDES 8.6MG 1 TABLET: 8.6; 5 TABLET, FILM COATED ORAL at 17:02

## 2022-09-01 RX ADMIN — INSULIN HUMAN 25 UNITS: 100 INJECTION, SUSPENSION SUBCUTANEOUS at 17:02

## 2022-09-01 RX ADMIN — DOCUSATE SODIUM 50MG AND SENNOSIDES 8.6MG 1 TABLET: 8.6; 5 TABLET, FILM COATED ORAL at 09:06

## 2022-09-01 RX ADMIN — ATENOLOL 50 MG: 50 TABLET ORAL at 09:04

## 2022-09-01 RX ADMIN — CANDESARTAN 16 MG: 32 TABLET ORAL at 09:14

## 2022-09-01 RX ADMIN — CEFAZOLIN SODIUM 2 G: 1 POWDER, FOR SOLUTION INTRAMUSCULAR; INTRAVENOUS at 00:11

## 2022-09-01 RX ADMIN — CEFAZOLIN SODIUM 2 G: 1 POWDER, FOR SOLUTION INTRAMUSCULAR; INTRAVENOUS at 16:13

## 2022-09-01 RX ADMIN — ACETAMINOPHEN 650 MG: 325 TABLET, FILM COATED ORAL at 10:34

## 2022-09-01 RX ADMIN — ENOXAPARIN SODIUM 40 MG: 100 INJECTION SUBCUTANEOUS at 09:06

## 2022-09-01 RX ADMIN — Medication 1 TABLET: at 09:06

## 2022-09-01 NOTE — PROGRESS NOTES
Orthopedic NP Progress Note  Post Op day: 5 Days Post-Op    September 1, 2022 9:40 AM     Jai Christian    Attending Physician: Treatment Team: Attending Provider: Suray Salinas MD; Consulting Provider: Clover Contreras MD; Utilization Review: Willi Zepeda RN; Consulting Provider: Rae Pozo MD; Consulting Provider: Joyce Urrutia DO; Consulting Provider: Tomas Camacho NP; Care Manager: Yu Franks Primary Nurse: Priyanka Montana RN; Physical Therapist: Jose E Hardin PT, DPT     Vital Signs:    Patient Vitals for the past 8 hrs:   BP Temp Pulse Resp SpO2   09/01/22 0851 (!) 157/74 98.4 °F (36.9 °C) 73 18 97 %   09/01/22 0352 (!) 158/81 98.3 °F (36.8 °C) 72 16 96 %     BMI (calculated): 32.3 (08/31/22 2000)    Intake/Output:  No intake/output data recorded. 08/30 1901 - 09/01 0700  In: -   Out: 20 [Drains:20]    Pain Control:   Pain Assessment  Pain Scale 1: Numeric (0 - 10)  Pain Intensity 1: 7  Pain Onset 1: post op  Pain Location 1: Knee  Pain Orientation 1: Left  Pain Description 1: Pressure  Pain Intervention(s) 1: Medication (see MAR)    LAB:    Recent Labs     08/30/22  0334   HCT 31.1*   HGB 10.2*     Lab Results   Component Value Date/Time    Sodium 141 08/30/2022 03:34 AM    Potassium 3.3 (L) 08/30/2022 03:34 AM    Chloride 105 08/30/2022 03:34 AM    CO2 31 08/30/2022 03:34 AM    Glucose 143 (H) 08/30/2022 03:34 AM    BUN 12 08/30/2022 03:34 AM    Creatinine 0.61 08/30/2022 03:34 AM    Calcium 9.3 08/30/2022 03:34 AM       Subjective:  Jai Christian is a 74 y.o. female s/p a  Procedure(s):  INCISION AND DRAINAGE LEFT KNEE   Procedure(s):  INCISION AND DRAINAGE LEFT KNEE. Tolerating diet. Afebrile, WBC improving. Pain is min and has been well controlled with tylenol PRN       Objective: General: alert, cooperative, no distress. Neuro/Vascular: CNS Intact. Sensation stable.  Brisk cap refill, 2+ pulses UE/LE  Musculoskeletal:  +ROM UE/RLE, L knee good ROM with min pain, no effusion or erythema. Skin: Incision - clean, dry and intact. No significant erythema or swelling. Dressing: clean, dry, and intact    Ruiz - n  Drain - n       PT/OT:   Gait:  Gait  Speed/Shira: Pace decreased (<100 feet/min)  Step Length: Left shortened  Swing Pattern: Left asymmetrical  Stance: Left decreased  Gait Abnormalities: Antalgic  Ambulation - Level of Assistance: Contact guard assistance  Distance (ft): 75 Feet (ft)  Assistive Device: Gait belt, Walker, rolling  Rail Use: Left   Stairs - Level of Assistance: Stand-by assistance  Number of Stairs Trained: 4                   Assessment:    s/p Procedure(s):  INCISION AND DRAINAGE LEFT KNEE    Active Problems:    Sepsis ([address] (8/25/2022)         Plan:   - S/P I&D of L knee - drain removed yesterday, cultures staph areus postiive, continue WBAT with PT/OT and Avoid excessive ROM of left knee for 1-2 weeks, continue abx as per ID plan   - DVT ppx: Lovenox 40 mg SC daily. DVT ppx x 1 month. Can transition to ASA 81 BID on discharge.   -  follow up with Dr. Melo Ayala in 10 days. Dr. Melo Ayala aware and agree with plan.      Signed By: Sujatha Carreon NP    Orthopedic Nurse Practitioner

## 2022-09-01 NOTE — PROGRESS NOTES
Problem: Falls - Risk of  Goal: *Absence of Falls  Description: Document Alex Griggs Fall Risk and appropriate interventions in the flowsheet.   Outcome: Progressing Towards Goal  Note: Fall Risk Interventions:  Mobility Interventions: Utilize gait belt for transfers/ambulation         Medication Interventions: Teach patient to arise slowly    Elimination Interventions: Patient to call for help with toileting needs              Problem: Knee Replacement: Post-Op Day 1  Goal: Off Pathway (Use only if patient is Off Pathway)  Outcome: Progressing Towards Goal  Goal: Activity/Safety  Outcome: Progressing Towards Goal

## 2022-09-01 NOTE — PROGRESS NOTES
Problem: Falls - Risk of  Goal: *Absence of Falls  Description: Document Earma Ant Fall Risk and appropriate interventions in the flowsheet. Outcome: Progressing Towards Goal  Note: Fall Risk Interventions:  Mobility Interventions: Utilize gait belt for transfers/ambulation         Medication Interventions: Teach patient to arise slowly    Elimination Interventions: Patient to call for help with toileting needs              Problem: Diabetes Self-Management  Goal: *Disease process and treatment process  Description: Define diabetes and identify own type of diabetes; list 3 options for treating diabetes. Outcome: Progressing Towards Goal  Goal: *Incorporating nutritional management into lifestyle  Description: Describe effect of type, amount and timing of food on blood glucose; list 3 methods for planning meals. Outcome: Progressing Towards Goal  Goal: *Incorporating physical activity into lifestyle  Description: State effect of exercise on blood glucose levels. Outcome: Progressing Towards Goal  Goal: *Developing strategies to promote health/change behavior  Description: Define the ABC's of diabetes; identify appropriate screenings, schedule and personal plan for screenings. Outcome: Progressing Towards Goal  Goal: *Using medications safely  Description: State effect of diabetes medications on diabetes; name diabetes medication taking, action and side effects. Outcome: Progressing Towards Goal  Goal: *Monitoring blood glucose, interpreting and using results  Description: Identify recommended blood glucose targets  and personal targets. Outcome: Progressing Towards Goal  Goal: *Prevention, detection, treatment of acute complications  Description: List symptoms of hyper- and hypoglycemia; describe how to treat low blood sugar and actions for lowering  high blood glucose level.   Outcome: Progressing Towards Goal  Goal: *Prevention, detection and treatment of chronic complications  Description: Define the natural course of diabetes and describe the relationship of blood glucose levels to long term complications of diabetes.   Outcome: Progressing Towards Goal  Goal: *Developing strategies to address psychosocial issues  Description: Describe feelings about living with diabetes; identify support needed and support network  Outcome: Progressing Towards Goal  Goal: *Insulin pump training  Outcome: Progressing Towards Goal  Goal: *Sick day guidelines  Outcome: Progressing Towards Goal  Goal: *Patient Specific Goal (EDIT GOAL, INSERT TEXT)  Outcome: Progressing Towards Goal     Problem: Knee Replacement: Post-Op Day 2  Goal: Activity/Safety  Outcome: Progressing Towards Goal  Goal: Respiratory  Outcome: Progressing Towards Goal  Goal: Treatments/Interventions/Procedures  Outcome: Progressing Towards Goal

## 2022-09-01 NOTE — DISCHARGE SUMMARY
Jair Crenshaw [facility]  [address] [facility], [address] [place] [address]  [phone]    Hospitalist Discharge Summary     Patient ID:  Kunal Willoughby  [id number]  74 y.o.  1955    Admit date: 8/25/2022    Discharge date and time: 9/1/2022 11:26 AM    Admission Diagnoses: Sepsis [facility]) [A41.9]    Discharge Diagnoses:  Principal Diagnosis Septic joint of left knee joint ([address]                                            Principal Problem:    Septic joint of left knee joint ([address] (9/1/2022)    Active Problems:    Sepsis ([address] (8/25/2022)      MSSA bacteremia (9/1/2022)           Hospital Course:     75 yo hx of HTN, DM, presented w/ L septic knee, MSSA bacteremia, UTI. S/p L knee I&D on 08/27     1) L septic knee: s/p I&D by Ortho on 08/27. Cx growing MSSA. Will need  IV ancef 2gm every 8h until 09/24 via PICC line (per ID). Patient will follow up with Ortho, Dr. Dania Powers      2) Sepsis/MSSA bacteremia: due to above. MELI on 08/30 neg for vegetations. Cont IV abx above     3) MSSA UTI: due to bacteremia. Already on abx     4) DM type 2 w/ hyperglycemia: A1C 10.6%. Cont NPH     5) HTN: uncontrolled. Driven by pain, sepsis. Cont home candesartan, atenolol     6) Hypokalemia: repleted    PCP: None     Consults:  Ortho, ID    Significant Diagnostic Studies: L knee I&D, PICC line     Discharge Exam:  Physical Exam:    Gen:  Well-developed, well-nourished, obese, in no acute distress  HEENT:  Pink conjunctivae, PERRL, hearing intact to voice, moist mucous membranes  Neck:  Supple, without masses, thyroid non-tender  Resp:  No accessory muscle use, clear breath sounds without wheezes rales or rhonchi  Card:  No murmurs, normal S1, S2 without thrills, bruits or peripheral edema  Abd:  Soft, non-tender, non-distended, normoactive bowel sounds are present  Musc:  No cyanosis or clubbing. L knee effusion.   Pain to ROM  Skin:  No rashes, L knee dressing c/d/I  Neuro:  Cranial nerves 3-12 are grossly intact, follows commands appropriately  Psych:  Good insight, oriented to person, place and time, alert    Disposition: home  Discharge Condition: Stable    Patient Instructions:   Current Discharge Medication List        START taking these medications    Details   ibuprofen (MOTRIN) 600 mg tablet Take 1 Tablet by mouth every eight (8) hours as needed for Pain. Qty: 30 Tablet, Refills: 0      senna-docusate (PERICOLACE) 8.6-50 mg per tablet Take 1 Tablet by mouth two (2) times a day. Qty: 60 Tablet, Refills: 0      ceFAZolin 2 g IV syringe 2 g by IntraVENous route every eight (8) hours for 23 days. Qty: 69 Dose, Refills: 0      oxyCODONE IR (Roxicodone) 5 mg immediate release tablet Take 1 Tablet by mouth every six (6) hours as needed for Pain for up to 5 days. Max Daily Amount: 20 mg. Indications: pain  Qty: 15 Tablet, Refills: 0    Associated Diagnoses: Staphylococcal arthritis of left knee (HCC)      OTHER,NON-FORMULARY, Freestyle glucometer with supplies. Use as directed  Qty: 1 Each, Refills: 0      insulin NPH (HUMULIN N) 100 unit/mL (3 mL) inpn 25 Units by SubCUTAneous route Before breakfast and dinner. Indications: type 2 diabetes mellitus  Qty: 2 Each, Refills: 1           CONTINUE these medications which have CHANGED    Details   aspirin 81 mg chewable tablet Take 1 Tablet by mouth two (2) times a day. Qty: 60 Tablet, Refills: 0      atenoloL (TENORMIN) 50 mg tablet Take 1 Tablet by mouth daily. Qty: 30 Tablet, Refills: 0      candesartan (Atacand) 32 mg tablet Take 0.5 Tablets by mouth daily.   Qty: 30 Tablet, Refills: 0           STOP taking these medications       insulin NPH/insulin regular (NOVOLIN 70/30, HUMULIN 70/30) 100 unit/mL (70-30) injection Comments:   Reason for Stopping:             Activity: See surgical instructions  Diet: Diabetic Diet  Wound Care: As directed    Follow-up with  Follow-up Information       Follow up With Specialties Details Why [facility] Follow up Go to the office for PICC care [address] [facility], [address]    [address] at [address] [place] [address] on 9/6/2022 at 10:30 AM for weekly lab work [address] [place] [address].   [address] [facility], [id number] [place] [address]    [phone]    Celina Baker MD Orthopedic Surgery Follow up in 1 week(s) For suture removal, For wound re-check [address] [place] [id number] [phone]      None    None (186) Patient stated that they have no PCP               Follow-up tests/labs none    Signed:  Titi iVtale MD  9/1/2022  11:26 AM  **I personally spent 45 min on discharge**

## 2022-09-01 NOTE — PROGRESS NOTES
9/1/2022  12:10 PM  Care Management Progress Note      ICD-10-CM ICD-9-CM    1. Severe sepsis (HCC)  A41.9 038.9     R65.20 995.92       2. Hemorrhagic cystitis  N30.91 595.9       3. Sepsis due to methicillin susceptible Staphylococcus aureus, unspecified whether acute organ dysfunction present ([facility] [A41.01 (ICD-10-CM)]  A41.01 038.11      995.91       4. Staphylococcal arthritis of left knee (HCC) [M00.062 (ICD-10-CM)]  M00.062 711.06 oxyCODONE IR (Roxicodone) 5 mg immediate release tablet     041.10       5. MSSA bacteremia [R78.81, B95.61 (ICD-10-CM)]  R78.81 790.7     B95.61 041.11           RUR:  8%  Risk Level: [x]Low []Moderate []High  Value-based purchasing: [] Yes [x] No  Bundle patient: [] Yes [x] No   Specify:     Transition of care plan:  Discharge order submitted. Pt has medically cleared. Home with IV abx through [place] [address], and they will provide PICC line care. Pt to receive weekly labs through [facility]. Outpatient follow-up. Pt's family to transport.     Care Management Interventions  Support Systems: Child(cony)  Confirm Follow Up Transport: Family  The Plan for Transition of Care is Related to the Following Treatment Goals : Sepsis  Discharge Location  Patient Expects to be Discharged to[de-identified] Home with infusion therapy

## 2022-09-01 NOTE — PROGRESS NOTES
Patient received discharge instructions and prescriptions given. No needs at this time. PICC line in place. Reviewed stroke and heart attack information. Answered all questions. Left time for clarification. Waiting on wheelchair to arrive.

## 2022-09-01 NOTE — PROGRESS NOTES
Problem: Mobility Impaired (Adult and Pediatric)  Goal: *Acute Goals and Plan of Care (Insert Text)  Description: FUNCTIONAL STATUS PRIOR TO ADMISSION: Patient was independent and active without use of DME. She is from [facility]. She comes to [address] [facility] to see daughter and family every 2-3 months and stays for ~ month or longer. HOME SUPPORT PRIOR TO ADMISSION: The patient lived with daughter and her family but did not require assist. 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 Physical Therapy Goals  Initiated 8/28/2022    1. Patient will ambulate with modified independence for 250 feet with the least restrictive device within 7 day(s). 2.  Patient will ascend/descend 4 stairs with 1 handrail(s) with modified independence within 7 day(s). Outcome: Progressing Towards Goal   PHYSICAL THERAPY TREATMENT  Patient: Jai Christian (89 y.o. female)  Date: 9/1/2022  Diagnosis: Sepsis ([address] [A41.9] Septic joint of left knee joint ([address]  Procedure(s) (LRB):  INCISION AND DRAINAGE LEFT KNEE (Left) 5 Days Post-Op  Precautions: Fall, WBAT (LLE); \"Avoid excessive ROM of left knee for 1-2 weeks\" per ortho note   Chart, physical therapy assessment, plan of care and goals were reviewed. ASSESSMENT  Patient continues with skilled PT services and is progressing towards goals. She ambulates increased distance with decreased assistance, completes stair training, and tolerates activity without complaints. Pt educated regarding ortho PA's instructions regarding L knee ROM and she verbalizes understanding. Pt demonstrates mobility sufficient for function within home environment and is cleared for discharge from PT perspective. Current Level of Function Impacting Discharge (mobility/balance): supervision on stairs    Other factors to consider for discharge: none additional         PLAN :  Patient continues to benefit from skilled intervention to address the above impairments.   Continue treatment per established plan of care.  to address goals. Recommendation for discharge: (in order for the patient to meet his/her long term goals)  Outpatient physical therapy follow up recommended for L knee when cleared by orthopedic team    This discharge recommendation:  Has not yet been discussed the attending provider and/or case management    IF patient discharges home will need the following DME: patient owns DME required for discharge       SUBJECTIVE:   Patient stated This is my red Tobias.  re: personal RW. Pt received supine, agreeable to PT and cleared by RN. OBJECTIVE DATA SUMMARY:   Critical Behavior:  Neurologic State: Alert  Orientation Level: Oriented X4  Cognition: Appropriate decision making, Appropriate for age attention/concentration, Appropriate safety awareness, Follows commands  Safety/Judgement: Awareness of environment, Good awareness of safety precautions, Home safety, Insight into deficits  Functional Mobility Training:  Bed Mobility:     Supine to Sit: Modified independent  Sit to Supine: Modified independent  Scooting: Modified independent        Transfers:  Sit to Stand: Modified independent  Stand to Sit: Modified independent                             Balance:  Sitting: Intact  Standing: With support  Standing - Static: Good  Standing - Dynamic : Good  Ambulation/Gait Training:  Distance (ft): 150 Feet (ft)  Assistive Device: Walker, rolling;Gait belt  Ambulation - Level of Assistance: Modified independent        Gait Abnormalities: Antalgic                                     Stairs:  Number of Stairs Trained: 2  Stairs - Level of Assistance: Supervision   Rail Use: Both    Therapeutic Exercises: Ankle pumps x 10  Pain Ratin/10 (NPS) L knee.   Offered cold pack with toweling, rest, repositioning, education, encouragement, distraction    Activity Tolerance:   Good    After treatment patient left in no apparent distress:   Supine in bed, Call bell within reach, Side rails x 3, and PCT present (Pt requesting bathing assistance.)    COMMUNICATION/COLLABORATION:   The patients plan of care was discussed with: Registered nurse and Rehabilitation technician.      Bobby Pair, PT, DPT   Time Calculation: 24 mins

## 2022-09-01 NOTE — PROGRESS NOTES
Bedside shift change report given to [facility] (oncoming nurse) by Chetser Linares (offgoing nurse). Report included the following information SBAR, Intake/Output, MAR, and Recent Results.

## 2022-09-01 NOTE — DISCHARGE INSTRUCTIONS
HOSPITALIST DISCHARGE INSTRUCTIONS  NAME: Gildardo Nurse   :  1955   MRN:  [id number]     Date/Time:  2022 11:25 AM    ADMIT DATE: 2022     DISCHARGE DATE: 2022     ADMITTING DIAGNOSIS:  L knee septic arthritis, MSSA bacteremia     DISCHARGE DIAGNOSIS:  same    MEDICATIONS:  See after visit summary       It is important that you take the medication exactly as they are prescribed. Keep your medication in the bottles provided by the pharmacist and keep a list of the medication names, dosages, and times to be taken in your wallet. Do not take other medications without consulting your doctor     Pain Management: per above medications    What to do at Home    Recommended diet:  Diabetic Diet    Recommended activity: See surgical instructions    1) Return to the hospital if you feel worse    2) If you experience any of the following symptoms then please call your primary care physician or return to the emergency room if you cannot get hold of your doctor:  Fever, chills, nausea, vomiting, diarrhea, change in mentation, falling, bleeding, shortness of breath, chest pain, severe headache, severe abdominal pain,     3) Use home IV antibiotics as directed    4) Follow up with orthopedics     Follow Up:  [address], [address] [place] [address]    [phone]  Follow up  Go to the office for PICC care    [address] at [address] [place] [address]. [address]    [phone]  Go on 2022  at 10:30 AM for weekly lab work    Brent Jacques MD  [address] [place] [id number] [phone]    Follow up in 1 week(s)  For suture removal, For wound re-check    None  None ([phone]) Patient stated that they have no PCP        . Information obtained by :  I understand that if any problems occur once I am at home I am to contact my physician.     I understand and acknowledge receipt of the instructions indicated above. Physician's or R.N.'s Signature                                                                  Date/Time                                                                                                                                              Patient or Representative Signature                                                          Date/Time         Andra Faye. MD [facility]  Foot and Ankle Surgery  General Orthopaedics      POST-OP Instructions      BLOOD CLOTS: To prevent blood clot formation, you have been prescribed    Lovenox 40 mg for 4 weeks or Aspirin 81 mg PO BID. PAIN CONTROL: For pain control, you have been prescribed narcotic    medication (Oxycodone or Norco). Take as prescribed and wean as able. VERY IMPORTANT - PLEASE READ  If you were given a nerve block by the anesthesia team to numb your leg, you must start taking pain medication BEFORE the block wears off EVEN IF YOU ARE NOT HAVING PAIN. If you do not start taking pain medication before the block wears off, you will be behind on pain control. The block will usually wear off in 12-24 hours after surgery, so you must have pain medication in your system before the block wears off. Take pain medication with food if possible to minimize stomach irritation. You may    take tylenol (acetaminophen) with Oxycodone but not with medications that    already have acetaminophen in them such as Norco. While taking narcotics, you    may have constipation. A stool softener is recommended-- you may use an over-    the-counter stool softener or use the one that has been prescribed for you. We    recommend no ibuprofen (Advil, Motrin, etc) since there are some studies that    show it may interfere with bone healing. Do not drink alcohol or drive while using    narcotic pain medication.              VERY IMPORTANT - PLEASE READ Please monitor the supply of your pain medicine closely and if it looks like you're going to run out of pain medicine over the weekend please call the office on [address] prior to the weekend to request a refill. The on call physician for nights and weekends CANNOT refill pain medicine. You will either have to wait until Monday morning when the office re-opens or you will have to go to an urgent care/ emergency room if you are in need of pain medicine. NAUSEA/VOMITING: Sometimes after surgery, patients have nausea or    vomiting. A medicine has been prescribed for this. If you do not have nausea or    vomiting, you do not need to have this prescription filled. HOME MEDICATIONS: Resume medications you were taking prior to surgery    unless you have been counseled to discontinue them. POST-OPERATIVE INSTRUCTIONS:    ACTIVITY: Please elevate your operative extremity above the level of your heart    for the first 4 days, as much as possible after surgery (a good way to remember    this is (toes above nose). Moving around and walking (with crutches) helps    decrease the risk of blood clots. While you are sleeping and when you are not    being active, continue to keep your leg elevated as much as possible. It is    common to have swelling in your feet after surgery for even a year afterwards, so    the more you keep the leg elevated after surgery, the less swelling you will have    later on. WEIGHTBEARING: You are to be weight-bearing as tolerated to your operative leg. GENERAL INSTRUCTIONS:    1. Be alert for signs of infection including redness, streaking, odor,    fever or chills. Be alert for excessive pain or bleeding and notify    your surgeon immediately.     2. Notify Provider of nausea, vomiting, or chills, numbness or weakness,    bleeding, redness, swelling, pain, or drainage from surgical incision(s), bowel or    bladder dysfunction, severe pain not relieved by pain medications, temperature    greater than 101.0 F (38.3 C) degrees    3. If you smoke (or have smoked within the last year), we strongly recommend    that you do not smoke. DIET AND COMFORT: Return to your regular diet as tolerated. Begin with light    or bland foods. Drink plenty of fluids. DRESSING CARE:    1. Leave your dressing/cast/splint in place until your post operative visit. Keep it    clean and dry. Mild to moderate blood or drainage after surgery is expected. 2. Keep your operative extremity elevated. Avoid pressure under the heel by    placing pillows under your calf, not your foot. FOLLOWUP INSTRUCTIONS:    1. Follow up in clinic with your surgeon within 1-2 weeks. If your appointment has    not already been made at the time of discharge, you will need to call to make the    appointment. 2. The general scheduling number is [phone]    3. Contact your physicians office during office hours.   For medical emergencies    call [phone]

## 2022-09-01 NOTE — PROGRESS NOTES
Spiritual Care Assessment/Progress Note  [address]      NAME: Eve Morales      MRN: [id number]  AGE: 74 y.o.  SEX: female  Adventism Affiliation: Jewish   Language: English     9/1/2022     Total Time (in minutes): 5     Spiritual Assessment begun in [facility] POST SURG ORT 1 through conversation with:         [x]Patient        [] Family    [] Friend(s)        Reason for Consult: [facility]     Spiritual beliefs: (Please include comment if needed)     [x] Identifies with a kena tradition:   Jewish      [] Supported by a kena community:            [] Claims no spiritual orientation:           [] Seeking spiritual identity:                [] Adheres to an individual form of spirituality:           [] Not able to assess:                           Identified resources for coping:      [x] Prayer                               [x] Music                  [] Guided Imagery     [x] Family/friends                 [] Pet visits     [] Devotional reading                         [] Unknown     [] Other:                                               Interventions offered during this visit: (See comments for more details)    Patient Interventions: Affirmation of kena, Communion (Jewish), Initial/Spiritual assessment, patient floor, Prayer (actual), Prayer (assurance of)           Plan of Care:     [x] Support spiritual and/or cultural needs    [] Support AMD and/or advance care planning process      [] Support grieving process   [] Coordinate Rites and/or Rituals    [] Coordination with community clergy   [] No spiritual needs identified at this time   [] Detailed Plan of Care below (See Comments)  [] Make referral to Music Therapy  [] Make referral to Pet Therapy     [] Make referral to Addiction services  [] Make referral to [facility]  [] Make referral to Spiritual Care Partner  [] No future visits requested        [] Contact Spiritual Care for further referrals     Comments:  Robert Goode was sitting up in her chair dressed and waiting to go home. She is originally from the [place]. Prayer and communion offered. She was grateful for the spiritual care given here at [facility].     ANITA Gore, RN, ACSW, LCSW   Page:  [phone]

## 2022-09-03 LAB
BACTERIA SPEC CULT: NORMAL
BACTERIA SPEC CULT: NORMAL
SERVICE CMNT-IMP: NORMAL
SERVICE CMNT-IMP: NORMAL

## 2022-09-06 ENCOUNTER — HOSPITAL ENCOUNTER (OUTPATIENT)
Dept: INFUSION THERAPY | Age: 67
Discharge: HOME | End: 2022-09-06
Payer: MEDICAID

## 2022-09-06 VITALS
TEMPERATURE: 98 F | HEART RATE: 71 BPM | OXYGEN SATURATION: 98 % | SYSTOLIC BLOOD PRESSURE: 134 MMHG | RESPIRATION RATE: 18 BRPM | DIASTOLIC BLOOD PRESSURE: 71 MMHG

## 2022-09-06 LAB
ALBUMIN SERPL-MCNC: 2.9 G/DL (ref 3.5–5)
ALBUMIN/GLOB SERPL: 0.6 {RATIO} (ref 1.1–2.2)
ALP SERPL-CCNC: 110 U/L (ref 45–117)
ALT SERPL-CCNC: 12 U/L (ref 12–78)
ANION GAP SERPL CALC-SCNC: 7 MMOL/L (ref 5–15)
AST SERPL-CCNC: 17 U/L (ref 15–37)
BASOPHILS # BLD: 0.1 K/UL (ref 0–0.1)
BASOPHILS NFR BLD: 0 % (ref 0–1)
BILIRUB DIRECT SERPL-MCNC: 0.1 MG/DL (ref 0–0.2)
BILIRUB SERPL-MCNC: 0.4 MG/DL (ref 0.2–1)
BUN SERPL-MCNC: 14 MG/DL (ref 6–20)
BUN/CREAT SERPL: 17 (ref 12–20)
CALCIUM SERPL-MCNC: 9.1 MG/DL (ref 8.5–10.1)
CHLORIDE SERPL-SCNC: 102 MMOL/L (ref 97–108)
CO2 SERPL-SCNC: 27 MMOL/L (ref 21–32)
CREAT SERPL-MCNC: 0.82 MG/DL (ref 0.55–1.02)
CRP SERPL HS-MCNC: >9.5 MG/L
DIFFERENTIAL METHOD BLD: ABNORMAL
EOSINOPHIL # BLD: 0.2 K/UL (ref 0–0.4)
EOSINOPHIL NFR BLD: 1 % (ref 0–7)
ERYTHROCYTE [DISTWIDTH] IN BLOOD BY AUTOMATED COUNT: 12.9 % (ref 11.5–14.5)
GLOBULIN SER CALC-MCNC: 5.1 G/DL (ref 2–4)
GLUCOSE SERPL-MCNC: 265 MG/DL (ref 65–100)
HCT VFR BLD AUTO: 32 % (ref 35–47)
HGB BLD-MCNC: 10.6 G/DL (ref 11.5–16)
IMM GRANULOCYTES # BLD AUTO: 0.1 K/UL (ref 0–0.04)
IMM GRANULOCYTES NFR BLD AUTO: 0 % (ref 0–0.5)
LYMPHOCYTES # BLD: 3.3 K/UL (ref 0.8–3.5)
LYMPHOCYTES NFR BLD: 21 % (ref 12–49)
MCH RBC QN AUTO: 26.7 PG (ref 26–34)
MCHC RBC AUTO-ENTMCNC: 33.1 G/DL (ref 30–36.5)
MCV RBC AUTO: 80.6 FL (ref 80–99)
MONOCYTES # BLD: 0.8 K/UL (ref 0–1)
MONOCYTES NFR BLD: 5 % (ref 5–13)
NEUTS SEG # BLD: 11.4 K/UL (ref 1.8–8)
NEUTS SEG NFR BLD: 73 % (ref 32–75)
NRBC # BLD: 0 K/UL (ref 0–0.01)
NRBC BLD-RTO: 0 PER 100 WBC
PLATELET # BLD AUTO: 475 K/UL (ref 150–400)
PMV BLD AUTO: 9.2 FL (ref 8.9–12.9)
POTASSIUM SERPL-SCNC: 3.5 MMOL/L (ref 3.5–5.1)
PROT SERPL-MCNC: 8 G/DL (ref 6.4–8.2)
RBC # BLD AUTO: 3.97 M/UL (ref 3.8–5.2)
SODIUM SERPL-SCNC: 136 MMOL/L (ref 136–145)
WBC # BLD AUTO: 15.7 K/UL (ref 3.6–11)

## 2022-09-06 PROCEDURE — 86141 C-REACTIVE PROTEIN HS: CPT

## 2022-09-06 PROCEDURE — 80076 HEPATIC FUNCTION PANEL: CPT

## 2022-09-06 PROCEDURE — 36415 COLL VENOUS BLD VENIPUNCTURE: CPT

## 2022-09-06 PROCEDURE — 80048 BASIC METABOLIC PNL TOTAL CA: CPT

## 2022-09-06 PROCEDURE — 85025 COMPLETE CBC W/AUTO DIFF WBC: CPT

## 2022-09-06 NOTE — PROGRESS NOTES
[facility] Lab Visit:      3776 Pt arrived ambulatory and in no distress, labs drawn 1 stick in left hand per KK . Departed [facility] ambulatory and in no distress. Visit Vitals  /71   Pulse 71   Temp 98 °F (36.7 °C)   Resp 18   SpO2 98%       Labs available in CC once resulted.

## 2022-09-13 ENCOUNTER — HOSPITAL ENCOUNTER (OUTPATIENT)
Dept: INFUSION THERAPY | Age: 67
Discharge: HOME | End: 2022-09-13
Payer: MEDICAID

## 2022-09-13 VITALS
RESPIRATION RATE: 18 BRPM | DIASTOLIC BLOOD PRESSURE: 66 MMHG | TEMPERATURE: 97.6 F | SYSTOLIC BLOOD PRESSURE: 133 MMHG | OXYGEN SATURATION: 97 % | HEART RATE: 72 BPM

## 2022-09-13 LAB
ALBUMIN SERPL-MCNC: 3.2 G/DL (ref 3.5–5)
ALBUMIN/GLOB SERPL: 0.6 {RATIO} (ref 1.1–2.2)
ALP SERPL-CCNC: 87 U/L (ref 45–117)
ALT SERPL-CCNC: 11 U/L (ref 12–78)
ANION GAP SERPL CALC-SCNC: 8 MMOL/L (ref 5–15)
AST SERPL-CCNC: 15 U/L (ref 15–37)
BASOPHILS # BLD: 0.1 K/UL (ref 0–0.1)
BASOPHILS NFR BLD: 1 % (ref 0–1)
BILIRUB DIRECT SERPL-MCNC: 0.1 MG/DL (ref 0–0.2)
BILIRUB SERPL-MCNC: 0.4 MG/DL (ref 0.2–1)
BUN SERPL-MCNC: 16 MG/DL (ref 6–20)
BUN/CREAT SERPL: 19 (ref 12–20)
CALCIUM SERPL-MCNC: 9.9 MG/DL (ref 8.5–10.1)
CHLORIDE SERPL-SCNC: 102 MMOL/L (ref 97–108)
CO2 SERPL-SCNC: 28 MMOL/L (ref 21–32)
CREAT SERPL-MCNC: 0.84 MG/DL (ref 0.55–1.02)
CRP SERPL HS-MCNC: >9.5 MG/L
DIFFERENTIAL METHOD BLD: ABNORMAL
EOSINOPHIL # BLD: 0.4 K/UL (ref 0–0.4)
EOSINOPHIL NFR BLD: 3 % (ref 0–7)
ERYTHROCYTE [DISTWIDTH] IN BLOOD BY AUTOMATED COUNT: 13 % (ref 11.5–14.5)
GLOBULIN SER CALC-MCNC: 5.3 G/DL (ref 2–4)
GLUCOSE SERPL-MCNC: 236 MG/DL (ref 65–100)
HCT VFR BLD AUTO: 35.6 % (ref 35–47)
HGB BLD-MCNC: 11.3 G/DL (ref 11.5–16)
IMM GRANULOCYTES # BLD AUTO: 0 K/UL (ref 0–0.04)
IMM GRANULOCYTES NFR BLD AUTO: 0 % (ref 0–0.5)
LYMPHOCYTES # BLD: 3.1 K/UL (ref 0.8–3.5)
LYMPHOCYTES NFR BLD: 24 % (ref 12–49)
MCH RBC QN AUTO: 26.6 PG (ref 26–34)
MCHC RBC AUTO-ENTMCNC: 31.7 G/DL (ref 30–36.5)
MCV RBC AUTO: 83.8 FL (ref 80–99)
MONOCYTES # BLD: 0.8 K/UL (ref 0–1)
MONOCYTES NFR BLD: 6 % (ref 5–13)
NEUTS SEG # BLD: 8.3 K/UL (ref 1.8–8)
NEUTS SEG NFR BLD: 66 % (ref 32–75)
NRBC # BLD: 0 K/UL (ref 0–0.01)
NRBC BLD-RTO: 0 PER 100 WBC
PLATELET # BLD AUTO: 450 K/UL (ref 150–400)
PMV BLD AUTO: 9.8 FL (ref 8.9–12.9)
POTASSIUM SERPL-SCNC: 3.7 MMOL/L (ref 3.5–5.1)
PROT SERPL-MCNC: 8.5 G/DL (ref 6.4–8.2)
RBC # BLD AUTO: 4.25 M/UL (ref 3.8–5.2)
SODIUM SERPL-SCNC: 138 MMOL/L (ref 136–145)
WBC # BLD AUTO: 12.6 K/UL (ref 3.6–11)

## 2022-09-13 PROCEDURE — 86141 C-REACTIVE PROTEIN HS: CPT

## 2022-09-13 PROCEDURE — 80076 HEPATIC FUNCTION PANEL: CPT

## 2022-09-13 PROCEDURE — 85025 COMPLETE CBC W/AUTO DIFF WBC: CPT

## 2022-09-13 PROCEDURE — 36415 COLL VENOUS BLD VENIPUNCTURE: CPT

## 2022-09-13 PROCEDURE — 80048 BASIC METABOLIC PNL TOTAL CA: CPT

## 2022-09-13 NOTE — PROGRESS NOTES
[facility] Lab Visit:     0889 Pt arrived ambulatory and in no distress, labs drawn 1 stick per KK . Departed [facility] ambulatory and in no distress. Visit Vitals  /66   Pulse 72   Temp 97.6 °F (36.4 °C)   Resp 18   SpO2 97%       Labs available in CC once resulted.

## 2022-09-20 ENCOUNTER — HOSPITAL ENCOUNTER (OUTPATIENT)
Dept: INFUSION THERAPY | Age: 67
Discharge: HOME | End: 2022-09-20
Payer: MEDICAID

## 2022-09-20 VITALS
DIASTOLIC BLOOD PRESSURE: 64 MMHG | RESPIRATION RATE: 18 BRPM | HEART RATE: 69 BPM | OXYGEN SATURATION: 99 % | TEMPERATURE: 97.3 F | SYSTOLIC BLOOD PRESSURE: 163 MMHG

## 2022-09-20 LAB
ALBUMIN SERPL-MCNC: 3.3 G/DL (ref 3.5–5)
ALBUMIN/GLOB SERPL: 0.7 {RATIO} (ref 1.1–2.2)
ALP SERPL-CCNC: 75 U/L (ref 45–117)
ALT SERPL-CCNC: 9 U/L (ref 12–78)
ANION GAP SERPL CALC-SCNC: 6 MMOL/L (ref 5–15)
AST SERPL-CCNC: 12 U/L (ref 15–37)
BASOPHILS # BLD: 0.1 K/UL (ref 0–0.1)
BASOPHILS NFR BLD: 1 % (ref 0–1)
BILIRUB DIRECT SERPL-MCNC: 0.1 MG/DL (ref 0–0.2)
BILIRUB SERPL-MCNC: 0.4 MG/DL (ref 0.2–1)
BUN SERPL-MCNC: 12 MG/DL (ref 6–20)
BUN/CREAT SERPL: 14 (ref 12–20)
CALCIUM SERPL-MCNC: 9.5 MG/DL (ref 8.5–10.1)
CHLORIDE SERPL-SCNC: 105 MMOL/L (ref 97–108)
CO2 SERPL-SCNC: 30 MMOL/L (ref 21–32)
CREAT SERPL-MCNC: 0.85 MG/DL (ref 0.55–1.02)
CRP SERPL-MCNC: <0.29 MG/DL (ref 0–0.6)
DIFFERENTIAL METHOD BLD: NORMAL
EOSINOPHIL # BLD: 0.3 K/UL (ref 0–0.4)
EOSINOPHIL NFR BLD: 3 % (ref 0–7)
ERYTHROCYTE [DISTWIDTH] IN BLOOD BY AUTOMATED COUNT: 13.4 % (ref 11.5–14.5)
GLOBULIN SER CALC-MCNC: 4.7 G/DL (ref 2–4)
GLUCOSE SERPL-MCNC: 117 MG/DL (ref 65–100)
HCT VFR BLD AUTO: 35.4 % (ref 35–47)
HGB BLD-MCNC: 11.5 G/DL (ref 11.5–16)
IMM GRANULOCYTES # BLD AUTO: 0 K/UL (ref 0–0.04)
IMM GRANULOCYTES NFR BLD AUTO: 0 % (ref 0–0.5)
LYMPHOCYTES # BLD: 2.6 K/UL (ref 0.8–3.5)
LYMPHOCYTES NFR BLD: 33 % (ref 12–49)
MCH RBC QN AUTO: 26.3 PG (ref 26–34)
MCHC RBC AUTO-ENTMCNC: 32.5 G/DL (ref 30–36.5)
MCV RBC AUTO: 81 FL (ref 80–99)
MONOCYTES # BLD: 0.5 K/UL (ref 0–1)
MONOCYTES NFR BLD: 6 % (ref 5–13)
NEUTS SEG # BLD: 4.5 K/UL (ref 1.8–8)
NEUTS SEG NFR BLD: 57 % (ref 32–75)
NRBC # BLD: 0 K/UL (ref 0–0.01)
NRBC BLD-RTO: 0 PER 100 WBC
PLATELET # BLD AUTO: 350 K/UL (ref 150–400)
PMV BLD AUTO: 10 FL (ref 8.9–12.9)
POTASSIUM SERPL-SCNC: 3.3 MMOL/L (ref 3.5–5.1)
PROT SERPL-MCNC: 8 G/DL (ref 6.4–8.2)
RBC # BLD AUTO: 4.37 M/UL (ref 3.8–5.2)
SODIUM SERPL-SCNC: 141 MMOL/L (ref 136–145)
WBC # BLD AUTO: 7.9 K/UL (ref 3.6–11)

## 2022-09-20 PROCEDURE — 85025 COMPLETE CBC W/AUTO DIFF WBC: CPT

## 2022-09-20 PROCEDURE — 36415 COLL VENOUS BLD VENIPUNCTURE: CPT

## 2022-09-20 PROCEDURE — 86140 C-REACTIVE PROTEIN: CPT

## 2022-09-20 PROCEDURE — 80076 HEPATIC FUNCTION PANEL: CPT

## 2022-09-20 PROCEDURE — 80048 BASIC METABOLIC PNL TOTAL CA: CPT

## 2022-09-20 NOTE — PROGRESS NOTES
[facility]:      0760 Pt arrived ambulatory and in no distress, labs drawn 1 stick in left [facility] per KK . Departed [facility] ambulatory and in no distress. Visit Vitals  BP (!) 163/64   Pulse 69   Temp 97.3 °F (36.3 °C)   Resp 18   SpO2 99%       Labs available in CC once resulted.

## 2022-09-27 ENCOUNTER — HOSPITAL ENCOUNTER (OUTPATIENT)
Dept: INFUSION THERAPY | Age: 67
Discharge: HOME | End: 2022-09-27
Payer: MEDICAID

## 2022-09-27 VITALS
OXYGEN SATURATION: 99 % | TEMPERATURE: 97.8 F | RESPIRATION RATE: 18 BRPM | SYSTOLIC BLOOD PRESSURE: 125 MMHG | HEART RATE: 79 BPM | DIASTOLIC BLOOD PRESSURE: 70 MMHG

## 2022-09-27 LAB
ALBUMIN SERPL-MCNC: 3.4 G/DL (ref 3.5–5)
ALBUMIN/GLOB SERPL: 0.8 {RATIO} (ref 1.1–2.2)
ALP SERPL-CCNC: 70 U/L (ref 45–117)
ALT SERPL-CCNC: 11 U/L (ref 12–78)
ANION GAP SERPL CALC-SCNC: 6 MMOL/L (ref 5–15)
AST SERPL-CCNC: 15 U/L (ref 15–37)
BASOPHILS # BLD: 0.1 K/UL (ref 0–0.1)
BASOPHILS NFR BLD: 1 % (ref 0–1)
BILIRUB DIRECT SERPL-MCNC: 0.1 MG/DL (ref 0–0.2)
BILIRUB SERPL-MCNC: 0.6 MG/DL (ref 0.2–1)
BUN SERPL-MCNC: 12 MG/DL (ref 6–20)
BUN/CREAT SERPL: 16 (ref 12–20)
CALCIUM SERPL-MCNC: 9.7 MG/DL (ref 8.5–10.1)
CHLORIDE SERPL-SCNC: 102 MMOL/L (ref 97–108)
CO2 SERPL-SCNC: 28 MMOL/L (ref 21–32)
CREAT SERPL-MCNC: 0.77 MG/DL (ref 0.55–1.02)
CRP SERPL-MCNC: <0.29 MG/DL (ref 0–0.6)
DIFFERENTIAL METHOD BLD: NORMAL
EOSINOPHIL # BLD: 0.2 K/UL (ref 0–0.4)
EOSINOPHIL NFR BLD: 2 % (ref 0–7)
ERYTHROCYTE [DISTWIDTH] IN BLOOD BY AUTOMATED COUNT: 13.6 % (ref 11.5–14.5)
GLOBULIN SER CALC-MCNC: 4.5 G/DL (ref 2–4)
GLUCOSE SERPL-MCNC: 206 MG/DL (ref 65–100)
HCT VFR BLD AUTO: 36.1 % (ref 35–47)
HGB BLD-MCNC: 11.8 G/DL (ref 11.5–16)
IMM GRANULOCYTES # BLD AUTO: 0 K/UL (ref 0–0.04)
IMM GRANULOCYTES NFR BLD AUTO: 0 % (ref 0–0.5)
LYMPHOCYTES # BLD: 2.8 K/UL (ref 0.8–3.5)
LYMPHOCYTES NFR BLD: 32 % (ref 12–49)
MCH RBC QN AUTO: 27.1 PG (ref 26–34)
MCHC RBC AUTO-ENTMCNC: 32.7 G/DL (ref 30–36.5)
MCV RBC AUTO: 82.8 FL (ref 80–99)
MONOCYTES # BLD: 0.5 K/UL (ref 0–1)
MONOCYTES NFR BLD: 6 % (ref 5–13)
NEUTS SEG # BLD: 5.3 K/UL (ref 1.8–8)
NEUTS SEG NFR BLD: 59 % (ref 32–75)
NRBC # BLD: 0 K/UL (ref 0–0.01)
NRBC BLD-RTO: 0 PER 100 WBC
PLATELET # BLD AUTO: 298 K/UL (ref 150–400)
PMV BLD AUTO: 10.5 FL (ref 8.9–12.9)
POTASSIUM SERPL-SCNC: 3.5 MMOL/L (ref 3.5–5.1)
PROT SERPL-MCNC: 7.9 G/DL (ref 6.4–8.2)
RBC # BLD AUTO: 4.36 M/UL (ref 3.8–5.2)
SODIUM SERPL-SCNC: 136 MMOL/L (ref 136–145)
WBC # BLD AUTO: 8.9 K/UL (ref 3.6–11)

## 2022-09-27 PROCEDURE — 36415 COLL VENOUS BLD VENIPUNCTURE: CPT

## 2022-09-27 PROCEDURE — 86140 C-REACTIVE PROTEIN: CPT

## 2022-09-27 PROCEDURE — 80048 BASIC METABOLIC PNL TOTAL CA: CPT

## 2022-09-27 PROCEDURE — 80076 HEPATIC FUNCTION PANEL: CPT

## 2022-09-27 PROCEDURE — 85025 COMPLETE CBC W/AUTO DIFF WBC: CPT

## 2022-09-27 NOTE — PROGRESS NOTES
[facility] Lab Visit:      1241FC arrived ambulatory and in no distress, labs drawn 1 stick in left ac pER kk . Departed [facility] ambulatory and in no distress. Visit Vitals  /70   Pulse 79   Temp 97.8 °F (36.6 °C)   Resp 18   SpO2 99%       Labs available in CC once resulted.

## (undated) DEVICE — SUTURE MCRYL SZ 2-0 L27IN ABSRB UD SH L26MM TAPERPOINT NDL Y417H

## (undated) DEVICE — BNDG ELAS HK LOOP 6X5YD NS -- MATRIX

## (undated) DEVICE — STOCKINETTE,IMPERVIOUS,12X48,STERILE: Brand: MEDLINE

## (undated) DEVICE — KIT EVAC 0.13IN RECT TB DIA10FR 400CC PVC 3 SPR Y CONN DRN

## (undated) DEVICE — CANISTER, RIGID, 3000CC: Brand: MEDLINE INDUSTRIES, INC.

## (undated) DEVICE — SOLUTION IRRIG 1000ML 0.9% SOD CHL USP POUR PLAS BTL

## (undated) DEVICE — PADDING CAST W4INXL4YD SPUN DACRON POLY POR SYN VERSATILE

## (undated) DEVICE — SWAB CULT DBL W/O CHAR RAYON TIP AMIES GEL CLMN FOR COLL

## (undated) DEVICE — SPONGE GZ W4XL4IN COT 12 PLY TYP VII WVN C FLD DSGN

## (undated) DEVICE — INTENDED FOR TISSUE SEPARATION, AND OTHER PROCEDURES THAT REQUIRE A SHARP SURGICAL BLADE TO PUNCTURE OR CUT.: Brand: BARD-PARKER ® CARBON RIB-BACK BLADES

## (undated) DEVICE — BASIC GENERAL-SFMC: Brand: MEDLINE INDUSTRIES, INC.

## (undated) DEVICE — TOWEL SURG W17XL27IN STD BLU COT NONFENESTRATED PREWASHED

## (undated) DEVICE — DRAPE,EXTREMITY,89X128,STERILE: Brand: MEDLINE

## (undated) DEVICE — STERILE POLYISOPRENE POWDER-FREE SURGICAL GLOVES: Brand: PROTEXIS

## (undated) DEVICE — INSULATED BLADE ELECTRODE: Brand: EDGE

## (undated) DEVICE — SHEET, DRAPE, SPLIT, STERILE: Brand: MEDLINE

## (undated) DEVICE — GLOVE ORANGE PI 7 1/2   MSG9075

## (undated) DEVICE — STAPLER SKIN H3.9MM WIRE DIA0.58MM CRWN 6.9MM 35 STPL ROT